# Patient Record
Sex: MALE | Race: ASIAN | Employment: FULL TIME | URBAN - METROPOLITAN AREA
[De-identification: names, ages, dates, MRNs, and addresses within clinical notes are randomized per-mention and may not be internally consistent; named-entity substitution may affect disease eponyms.]

---

## 2021-10-01 ENCOUNTER — OFFICE VISIT (OUTPATIENT)
Dept: INTERNAL MEDICINE CLINIC | Facility: CLINIC | Age: 46
End: 2021-10-01
Payer: COMMERCIAL

## 2021-10-01 VITALS
HEIGHT: 69 IN | TEMPERATURE: 97 F | HEART RATE: 68 BPM | DIASTOLIC BLOOD PRESSURE: 78 MMHG | RESPIRATION RATE: 18 BRPM | BODY MASS INDEX: 30.36 KG/M2 | SYSTOLIC BLOOD PRESSURE: 122 MMHG | OXYGEN SATURATION: 97 % | WEIGHT: 205 LBS

## 2021-10-01 DIAGNOSIS — Z13.0 SCREENING FOR BLOOD DISEASE: ICD-10-CM

## 2021-10-01 DIAGNOSIS — Z13.29 SCREENING FOR THYROID DISORDER: ICD-10-CM

## 2021-10-01 DIAGNOSIS — Z00.00 ROUTINE GENERAL MEDICAL EXAMINATION AT A HEALTH CARE FACILITY: Primary | ICD-10-CM

## 2021-10-01 DIAGNOSIS — H26.9 CATARACT OF RIGHT EYE, UNSPECIFIED CATARACT TYPE: ICD-10-CM

## 2021-10-01 DIAGNOSIS — Z13.220 SCREENING FOR LIPID DISORDERS: ICD-10-CM

## 2021-10-01 DIAGNOSIS — Z13.228 SCREENING FOR METABOLIC DISORDER: ICD-10-CM

## 2021-10-01 DIAGNOSIS — E78.5 DYSLIPIDEMIA: ICD-10-CM

## 2021-10-01 DIAGNOSIS — Z12.5 SCREENING FOR PROSTATE CANCER: ICD-10-CM

## 2021-10-01 PROCEDURE — 99386 PREV VISIT NEW AGE 40-64: CPT | Performed by: INTERNAL MEDICINE

## 2021-10-01 PROCEDURE — 3008F BODY MASS INDEX DOCD: CPT | Performed by: INTERNAL MEDICINE

## 2021-10-01 PROCEDURE — 3078F DIAST BP <80 MM HG: CPT | Performed by: INTERNAL MEDICINE

## 2021-10-01 PROCEDURE — 3074F SYST BP LT 130 MM HG: CPT | Performed by: INTERNAL MEDICINE

## 2021-10-01 RX ORDER — MULTIVITAMIN
1 TABLET ORAL DAILY
COMMUNITY

## 2021-10-01 RX ORDER — PRAVASTATIN SODIUM 20 MG
20 TABLET ORAL DAILY
COMMUNITY
Start: 2021-08-27

## 2021-10-01 NOTE — PROGRESS NOTES
Terrie Black  6/6/1975    Patient presents with:  Physical: RG rm 7 New pt Physical      HPI:   Terrie Black is a 40year old male who presents for an annual physical examination.     The patient describes a long-standing history of an intermitt • Dementia Father    • Glaucoma Father    • Other (Other) Father         prostate disease   • Diabetes Mother    • Lipids Mother    • Stroke Maternal Grandmother    • Other (Other) Maternal Grandmother         prostate issues   • Heart Disorder Paternal ORDERS     Orders Placed This Encounter      PSA Screen      CBC With Diff      CMP      Lipid      TSH W Reflex To Free T4      PSA - Total [5363][Q]      Meds & Refills:  Requested Prescriptions      No prescriptions requested or ordered in this encounte

## 2021-10-06 ENCOUNTER — MED REC SCAN ONLY (OUTPATIENT)
Dept: INTERNAL MEDICINE CLINIC | Facility: CLINIC | Age: 46
End: 2021-10-06

## 2021-10-15 ENCOUNTER — OFFICE VISIT (OUTPATIENT)
Dept: INTERNAL MEDICINE CLINIC | Facility: CLINIC | Age: 46
End: 2021-10-15
Payer: COMMERCIAL

## 2021-10-15 VITALS
RESPIRATION RATE: 16 BRPM | HEIGHT: 69.29 IN | TEMPERATURE: 98 F | HEART RATE: 79 BPM | OXYGEN SATURATION: 99 % | SYSTOLIC BLOOD PRESSURE: 122 MMHG | WEIGHT: 207.19 LBS | BODY MASS INDEX: 30.34 KG/M2 | DIASTOLIC BLOOD PRESSURE: 76 MMHG

## 2021-10-15 DIAGNOSIS — K21.9 GASTROESOPHAGEAL REFLUX DISEASE WITHOUT ESOPHAGITIS: ICD-10-CM

## 2021-10-15 DIAGNOSIS — E78.5 DYSLIPIDEMIA: Primary | ICD-10-CM

## 2021-10-15 PROCEDURE — 3074F SYST BP LT 130 MM HG: CPT | Performed by: INTERNAL MEDICINE

## 2021-10-15 PROCEDURE — 3078F DIAST BP <80 MM HG: CPT | Performed by: INTERNAL MEDICINE

## 2021-10-15 PROCEDURE — 99214 OFFICE O/P EST MOD 30 MIN: CPT | Performed by: INTERNAL MEDICINE

## 2021-10-15 PROCEDURE — 3008F BODY MASS INDEX DOCD: CPT | Performed by: INTERNAL MEDICINE

## 2021-10-15 RX ORDER — FAMOTIDINE 40 MG/1
40 TABLET, FILM COATED ORAL DAILY
Qty: 90 TABLET | Refills: 3 | Status: SHIPPED | OUTPATIENT
Start: 2021-10-15

## 2021-10-15 NOTE — PROGRESS NOTES
Renea Wheatley  6/6/1975    Patient presents with:  Test Results: ES rm - 7 Test results      Filippo Finch is a 40year old male who presents as a follow-up. The patient has remained in his usual state of health.   He denies any ac distal pulses. No murmur, rubs or gallops. Pulmonary/Chest: Effort normal and breath sounds normal. No respiratory distress. Abdominal: Soft. Bowel sounds are normal. Non tender, no masses, no organomegaly or hernias.   Musculoskeletal: No edema  Skin:

## 2022-01-06 ENCOUNTER — TELEMEDICINE (OUTPATIENT)
Dept: INTERNAL MEDICINE CLINIC | Facility: CLINIC | Age: 47
End: 2022-01-06

## 2022-01-06 DIAGNOSIS — M79.645 THUMB PAIN, LEFT: Primary | ICD-10-CM

## 2022-01-06 PROCEDURE — 99213 OFFICE O/P EST LOW 20 MIN: CPT | Performed by: INTERNAL MEDICINE

## 2022-01-06 RX ORDER — CLOTRIMAZOLE AND BETAMETHASONE DIPROPIONATE 10; .64 MG/G; MG/G
1 CREAM TOPICAL 2 TIMES DAILY PRN
Qty: 2 EACH | Refills: 3 | Status: SHIPPED | OUTPATIENT
Start: 2022-01-06

## 2022-01-06 NOTE — PROGRESS NOTES
Karen Akron  6/6/1975    No chief complaint on file. Video encounter    Abhay Goldstein is a 40year old male who presents with left thumb pain. The patient describes a 4-day duration of a pain involving the left thumb base.   Sym Ozzy Mason is a 40year old male who presents with left thumb pain.     Thumb tendinitis versus de Quervain's tenosynovitis  I have advised use of NSAID therapy and symptom observation  If symptoms improve, continue NSAID therapy with food until resolution

## 2022-01-21 ENCOUNTER — HOSPITAL ENCOUNTER (OUTPATIENT)
Dept: GENERAL RADIOLOGY | Age: 47
Discharge: HOME | End: 2022-01-21
Attending: INTERNAL MEDICINE
Payer: COMMERCIAL

## 2022-01-21 ENCOUNTER — OFFICE VISIT (OUTPATIENT)
Dept: INTERNAL MEDICINE CLINIC | Facility: CLINIC | Age: 47
End: 2022-01-21
Payer: COMMERCIAL

## 2022-01-21 ENCOUNTER — PATIENT MESSAGE (OUTPATIENT)
Dept: INTERNAL MEDICINE CLINIC | Facility: CLINIC | Age: 47
End: 2022-01-21

## 2022-01-21 VITALS
SYSTOLIC BLOOD PRESSURE: 112 MMHG | DIASTOLIC BLOOD PRESSURE: 82 MMHG | OXYGEN SATURATION: 99 % | HEIGHT: 69 IN | HEART RATE: 78 BPM | TEMPERATURE: 97 F | WEIGHT: 209 LBS | RESPIRATION RATE: 16 BRPM | BODY MASS INDEX: 30.96 KG/M2

## 2022-01-21 DIAGNOSIS — M79.671 CHRONIC PAIN OF RIGHT HEEL: ICD-10-CM

## 2022-01-21 DIAGNOSIS — G89.29 CHRONIC PAIN OF RIGHT HEEL: ICD-10-CM

## 2022-01-21 DIAGNOSIS — M79.645 PAIN OF LEFT THUMB: Primary | ICD-10-CM

## 2022-01-21 DIAGNOSIS — M79.645 PAIN OF LEFT THUMB: ICD-10-CM

## 2022-01-21 PROCEDURE — 3008F BODY MASS INDEX DOCD: CPT | Performed by: INTERNAL MEDICINE

## 2022-01-21 PROCEDURE — 73650 X-RAY EXAM OF HEEL: CPT | Performed by: INTERNAL MEDICINE

## 2022-01-21 PROCEDURE — 99213 OFFICE O/P EST LOW 20 MIN: CPT | Performed by: INTERNAL MEDICINE

## 2022-01-21 PROCEDURE — 3079F DIAST BP 80-89 MM HG: CPT | Performed by: INTERNAL MEDICINE

## 2022-01-21 PROCEDURE — 3074F SYST BP LT 130 MM HG: CPT | Performed by: INTERNAL MEDICINE

## 2022-01-21 PROCEDURE — 73130 X-RAY EXAM OF HAND: CPT | Performed by: INTERNAL MEDICINE

## 2022-01-21 NOTE — PROGRESS NOTES
Joaquín White  6/6/1975    Patient presents with: Follow - Up: On Lt thumb pain . Room # 6  Heel Pain: Rt       SUBJECTIVE   Joaquín White is a 40year old male who presents as a follow-up.     From prior encounter:   The patient describes a 4-da times daily as needed. 2 each 3   • triamcinolone 0.1 % External Cream Apply topically 2 (two) times daily as needed. 60 g 3   • famotidine (PEPCID) 40 MG Oral Tab Take 1 tablet (40 mg total) by mouth daily.  90 tablet 3   • pravastatin 20 MG Oral Tab Take follow-up.     Pain at left thumb base:  NSAID nonresponsive  X-ray ordered  Patient has been referred to the orthopedic surgery service for further evaluation and management    Chronic right heel pain:   Secondary to trauma several years ago  Begin evaluat

## 2022-01-24 ENCOUNTER — TELEPHONE (OUTPATIENT)
Dept: ORTHOPEDICS CLINIC | Facility: CLINIC | Age: 47
End: 2022-01-24

## 2022-01-24 NOTE — TELEPHONE ENCOUNTER
01/21/22 PROCEDURE:  XR HAND (MIN 3 VIEWS), LEFT   FINDINGS:  Joint spaces are normal.  No fracture, expansile lesion or erosion.  No periosteal reaction.  Soft tissue contours appear normal.   Impression   CONCLUSION:  No abnormality demonstrated.

## 2022-01-24 NOTE — TELEPHONE ENCOUNTER
From: Kanika Barba  To: Ivan Borden MD  Sent: 1/21/2022 5:14 PM CST  Subject: [facility] [facility] medical records     I am looking to get a copy of the medical records that is in my file from [facility] [address] [facility].  Please let me know whether you cou

## 2022-02-19 ENCOUNTER — PATIENT MESSAGE (OUTPATIENT)
Dept: INTERNAL MEDICINE CLINIC | Facility: CLINIC | Age: 47
End: 2022-02-19

## 2022-02-21 RX ORDER — TADALAFIL 20 MG/1
20 TABLET ORAL AS NEEDED
Qty: 24 TABLET | Refills: 1 | Status: SHIPPED | OUTPATIENT
Start: 2022-02-21 | End: 2022-02-23

## 2022-02-21 RX ORDER — PRAVASTATIN SODIUM 20 MG
20 TABLET ORAL DAILY
Qty: 90 TABLET | Refills: 0 | Status: SHIPPED | OUTPATIENT
Start: 2022-02-21

## 2022-02-21 NOTE — TELEPHONE ENCOUNTER
Last VISIT 01/21/22    Last CPE 10/01/21    Last REFILL Not on file    Last LABS 10/09/21 CBC, CMP, Lipid, TSH, PSA done    No future appointments. Per PROTOCOL? Not on protocol      Please Approve or Deny.

## 2022-02-23 RX ORDER — TADALAFIL 20 MG/1
20 TABLET ORAL AS NEEDED
Qty: 24 TABLET | Refills: 1 | Status: SHIPPED | OUTPATIENT
Start: 2022-02-23

## 2022-02-23 NOTE — TELEPHONE ENCOUNTER
Last visit- 01/21/2022    Last refill- 02/21/2022 tadalafil 20mg QTY24 1R    Last labs- 10/09/2021 psa, tsh, lipid, cmp, cbc    No future appointments. Per Protocol?   Please approve or deny

## 2022-04-27 RX ORDER — PRAVASTATIN SODIUM 20 MG
TABLET ORAL
Qty: 90 TABLET | Refills: 0 | Status: SHIPPED | OUTPATIENT
Start: 2022-04-27

## 2022-05-23 ENCOUNTER — OFFICE VISIT (OUTPATIENT)
Dept: INTERNAL MEDICINE CLINIC | Facility: CLINIC | Age: 47
End: 2022-05-23
Payer: COMMERCIAL

## 2022-05-23 VITALS
WEIGHT: 207 LBS | HEART RATE: 76 BPM | SYSTOLIC BLOOD PRESSURE: 124 MMHG | RESPIRATION RATE: 16 BRPM | TEMPERATURE: 98 F | HEIGHT: 69 IN | DIASTOLIC BLOOD PRESSURE: 82 MMHG | BODY MASS INDEX: 30.66 KG/M2 | OXYGEN SATURATION: 96 %

## 2022-05-23 DIAGNOSIS — M79.644 CHRONIC PAIN OF RIGHT THUMB: Primary | ICD-10-CM

## 2022-05-23 DIAGNOSIS — G89.29 CHRONIC PAIN OF RIGHT THUMB: Primary | ICD-10-CM

## 2022-05-23 DIAGNOSIS — L29.9 ITCH OF SKIN: ICD-10-CM

## 2022-05-23 PROCEDURE — 3074F SYST BP LT 130 MM HG: CPT | Performed by: INTERNAL MEDICINE

## 2022-05-23 PROCEDURE — 99214 OFFICE O/P EST MOD 30 MIN: CPT | Performed by: INTERNAL MEDICINE

## 2022-05-23 PROCEDURE — 3008F BODY MASS INDEX DOCD: CPT | Performed by: INTERNAL MEDICINE

## 2022-05-23 PROCEDURE — 3079F DIAST BP 80-89 MM HG: CPT | Performed by: INTERNAL MEDICINE

## 2022-06-27 ENCOUNTER — OFFICE VISIT (OUTPATIENT)
Dept: INTERNAL MEDICINE CLINIC | Facility: CLINIC | Age: 47
End: 2022-06-27
Payer: COMMERCIAL

## 2022-06-27 VITALS
WEIGHT: 203 LBS | SYSTOLIC BLOOD PRESSURE: 118 MMHG | TEMPERATURE: 97 F | HEIGHT: 69 IN | HEART RATE: 70 BPM | DIASTOLIC BLOOD PRESSURE: 74 MMHG | BODY MASS INDEX: 30.07 KG/M2

## 2022-06-27 DIAGNOSIS — D17.1 LIPOMA OF ABDOMINAL WALL: Primary | ICD-10-CM

## 2022-06-27 DIAGNOSIS — S39.012A STRAIN OF LUMBAR REGION, INITIAL ENCOUNTER: ICD-10-CM

## 2022-06-27 DIAGNOSIS — R39.15 URINARY URGENCY: ICD-10-CM

## 2022-06-27 DIAGNOSIS — M54.50 ACUTE LEFT-SIDED LOW BACK PAIN WITHOUT SCIATICA: ICD-10-CM

## 2022-06-27 LAB
BILIRUB UR QL STRIP.AUTO: NEGATIVE
BILIRUBIN: NEGATIVE
CLARITY UR REFRACT.AUTO: CLEAR
COLOR UR AUTO: YELLOW
GLUCOSE (URINE DIPSTICK): NEGATIVE MG/DL
GLUCOSE UR STRIP.AUTO-MCNC: NEGATIVE MG/DL
KETONES (URINE DIPSTICK): NEGATIVE MG/DL
KETONES UR STRIP.AUTO-MCNC: NEGATIVE MG/DL
LEUKOCYTE ESTERASE UR QL STRIP.AUTO: NEGATIVE
LEUKOCYTES: NEGATIVE
MULTISTIX LOT#: ABNORMAL NUMERIC
NITRITE UR QL STRIP.AUTO: NEGATIVE
NITRITE, URINE: NEGATIVE
PH UR STRIP.AUTO: 5 [PH] (ref 5–8)
PH, URINE: 5.5 (ref 4.5–8)
PROT UR STRIP.AUTO-MCNC: NEGATIVE MG/DL
PROTEIN (URINE DIPSTICK): NEGATIVE MG/DL
SP GR UR STRIP.AUTO: 1.02 (ref 1–1.03)
SPECIFIC GRAVITY: 1.02 (ref 1–1.03)
URINE-COLOR: YELLOW
UROBILINOGEN UR STRIP.AUTO-MCNC: <2 MG/DL
UROBILINOGEN,SEMI-QN: 0.2 MG/DL (ref 0–1.9)

## 2022-06-27 PROCEDURE — 81001 URINALYSIS AUTO W/SCOPE: CPT | Performed by: FAMILY MEDICINE

## 2022-06-27 RX ORDER — DOCUSATE SODIUM 100 MG/1
100 CAPSULE, LIQUID FILLED ORAL 2 TIMES DAILY PRN
COMMUNITY

## 2022-06-28 ENCOUNTER — PATIENT MESSAGE (OUTPATIENT)
Dept: INTERNAL MEDICINE CLINIC | Facility: CLINIC | Age: 47
End: 2022-06-28

## 2022-06-29 NOTE — TELEPHONE ENCOUNTER
From: Jose Sheffield  To: Reji Villar MD  Sent: 6/28/2022 6:06 PM CDT  Subject: Question regarding URINALYSIS WITH CULTURE REFLEX    I still do have a discomfort in my lower abdomen and slight pain and discomfort in my testicles.  Please advice on what I should do

## 2022-06-29 NOTE — TELEPHONE ENCOUNTER
Please advise on patient message. Last office visit: 6/27  UA Results 6/27: \"No significant microscopic hematuria, < 3 RBC/hpf. No bacteria seen.  No follow-up studies needed at this time\"

## 2022-06-30 ENCOUNTER — OFFICE VISIT (OUTPATIENT)
Dept: INTERNAL MEDICINE CLINIC | Facility: CLINIC | Age: 47
End: 2022-06-30
Payer: COMMERCIAL

## 2022-06-30 VITALS
TEMPERATURE: 98 F | OXYGEN SATURATION: 97 % | RESPIRATION RATE: 16 BRPM | SYSTOLIC BLOOD PRESSURE: 128 MMHG | WEIGHT: 204 LBS | HEART RATE: 74 BPM | DIASTOLIC BLOOD PRESSURE: 76 MMHG | HEIGHT: 69.09 IN | BODY MASS INDEX: 30.21 KG/M2

## 2022-06-30 DIAGNOSIS — S39.011A STRAIN OF ABDOMINAL MUSCLE, INITIAL ENCOUNTER: ICD-10-CM

## 2022-06-30 DIAGNOSIS — R10.9 ABDOMINAL WALL PAIN: Primary | ICD-10-CM

## 2022-06-30 PROCEDURE — 3074F SYST BP LT 130 MM HG: CPT | Performed by: FAMILY MEDICINE

## 2022-06-30 PROCEDURE — 3008F BODY MASS INDEX DOCD: CPT | Performed by: FAMILY MEDICINE

## 2022-06-30 PROCEDURE — 99213 OFFICE O/P EST LOW 20 MIN: CPT | Performed by: FAMILY MEDICINE

## 2022-06-30 PROCEDURE — 3078F DIAST BP <80 MM HG: CPT | Performed by: FAMILY MEDICINE

## 2022-07-27 RX ORDER — PRAVASTATIN SODIUM 20 MG
TABLET ORAL
Qty: 90 TABLET | Refills: 0 | Status: SHIPPED | OUTPATIENT
Start: 2022-07-27

## 2022-07-27 NOTE — TELEPHONE ENCOUNTER
Last visit- 10/01/2021 cpe seen by AD    Last refill- 04/27/2022 pravastatin 20mg QTY90 0R    Last labs- 10/09/2021 psa, tsh, lipid, cmp, cbc    No future appointments.     Per Protocol- Passed

## 2022-08-05 RX ORDER — PRAVASTATIN SODIUM 20 MG
TABLET ORAL
Qty: 90 TABLET | Refills: 0 | OUTPATIENT
Start: 2022-08-05

## 2022-08-23 ENCOUNTER — TELEPHONE (OUTPATIENT)
Dept: INTERNAL MEDICINE CLINIC | Facility: CLINIC | Age: 47
End: 2022-08-23

## 2022-08-23 NOTE — TELEPHONE ENCOUNTER
Patient given strong ER precautions with any return of pain in his testicle. Pt verbalizes understanding.

## 2022-08-23 NOTE — TELEPHONE ENCOUNTER
Patient states pt had severe pain at 1pm yesterday 8/22, started from left testicle to left lower abdomen and then around to his lower back, could not sit straight, nauseated, took Advil and famotidine then about 4:30-5pm yesterday the pain went away completely with no recurrence. Pt has an appt with [address] for 8/25/22. Pt notified to call if the pain recurs before his appt. Pt states his mother has a history of kidney stones. Pt verbalizes understanding. Any other recommendtions? LOV 6/30/22 with [address]

## 2022-08-23 NOTE — TELEPHONE ENCOUNTER
Started in left testicle, traveled to abdomen and then to back. Sharp stabbing pain, since yesterday. Patient is scheduled with [address] for 8/25/22.     Future Appointments   Date Time Provider Nahun Cisneros   8/25/2022  8:00 AM Ben Waterman MD [facility]

## 2022-08-25 ENCOUNTER — OFFICE VISIT (OUTPATIENT)
Dept: SURGERY | Facility: CLINIC | Age: 47
End: 2022-08-25
Payer: COMMERCIAL

## 2022-08-25 ENCOUNTER — OFFICE VISIT (OUTPATIENT)
Dept: INTERNAL MEDICINE CLINIC | Facility: CLINIC | Age: 47
End: 2022-08-25
Payer: COMMERCIAL

## 2022-08-25 ENCOUNTER — APPOINTMENT (OUTPATIENT)
Dept: LAB | Age: 47
End: 2022-08-25
Attending: FAMILY MEDICINE
Payer: COMMERCIAL

## 2022-08-25 ENCOUNTER — TELEPHONE (OUTPATIENT)
Dept: INTERNAL MEDICINE CLINIC | Facility: CLINIC | Age: 47
End: 2022-08-25

## 2022-08-25 ENCOUNTER — HOSPITAL ENCOUNTER (OUTPATIENT)
Dept: CT IMAGING | Age: 47
Discharge: HOME | End: 2022-08-25
Attending: FAMILY MEDICINE
Payer: COMMERCIAL

## 2022-08-25 ENCOUNTER — LAB ENCOUNTER (OUTPATIENT)
Dept: LAB | Facility: HOSPITAL | Age: 47
End: 2022-08-25
Attending: FAMILY MEDICINE
Payer: COMMERCIAL

## 2022-08-25 VITALS
HEART RATE: 64 BPM | SYSTOLIC BLOOD PRESSURE: 124 MMHG | DIASTOLIC BLOOD PRESSURE: 84 MMHG | TEMPERATURE: 96 F | BODY MASS INDEX: 29.77 KG/M2 | WEIGHT: 201 LBS | HEIGHT: 69 IN

## 2022-08-25 DIAGNOSIS — R31.29 MICROSCOPIC HEMATURIA: ICD-10-CM

## 2022-08-25 DIAGNOSIS — R10.9 ACUTE LEFT FLANK PAIN: ICD-10-CM

## 2022-08-25 DIAGNOSIS — R31.0 HEMATURIA, GROSS: ICD-10-CM

## 2022-08-25 DIAGNOSIS — N20.0 NEPHROLITHIASIS: Primary | ICD-10-CM

## 2022-08-25 DIAGNOSIS — R10.9 ACUTE LEFT FLANK PAIN: Primary | ICD-10-CM

## 2022-08-25 LAB
ANION GAP SERPL CALC-SCNC: 2 MMOL/L (ref 0–18)
APPEARANCE: CLEAR
BASOPHILS # BLD AUTO: 0.09 X10(3) UL (ref 0–0.2)
BASOPHILS NFR BLD AUTO: 1 %
BILIRUBIN: NEGATIVE
BUN BLD-MCNC: 16 MG/DL (ref 7–18)
CALCIUM BLD-MCNC: 9.5 MG/DL (ref 8.5–10.1)
CHLORIDE SERPL-SCNC: 103 MMOL/L (ref 98–112)
CO2 SERPL-SCNC: 30 MMOL/L (ref 21–32)
CREAT BLD-MCNC: 1.11 MG/DL
EOSINOPHIL # BLD AUTO: 0.34 X10(3) UL (ref 0–0.7)
EOSINOPHIL NFR BLD AUTO: 3.8 %
ERYTHROCYTE [DISTWIDTH] IN BLOOD BY AUTOMATED COUNT: 13 %
FASTING STATUS PATIENT QL REPORTED: NO
GFR SERPLBLD BASED ON 1.73 SQ M-ARVRAT: 83 ML/MIN/1.73M2 (ref 60–?)
GLUCOSE (URINE DIPSTICK): NEGATIVE MG/DL
GLUCOSE BLD-MCNC: 96 MG/DL (ref 70–99)
HCT VFR BLD AUTO: 47.8 %
HGB BLD-MCNC: 16.2 G/DL
IMM GRANULOCYTES # BLD AUTO: 0.02 X10(3) UL (ref 0–1)
IMM GRANULOCYTES NFR BLD: 0.2 %
KETONES (URINE DIPSTICK): NEGATIVE MG/DL
LEUKOCYTES: NEGATIVE
LYMPHOCYTES # BLD AUTO: 2.3 X10(3) UL (ref 1–4)
LYMPHOCYTES NFR BLD AUTO: 25.9 %
MCH RBC QN AUTO: 29.1 PG (ref 26–34)
MCHC RBC AUTO-ENTMCNC: 33.9 G/DL (ref 31–37)
MCV RBC AUTO: 86 FL
MONOCYTES # BLD AUTO: 0.86 X10(3) UL (ref 0.1–1)
MONOCYTES NFR BLD AUTO: 9.7 %
MULTISTIX LOT#: ABNORMAL NUMERIC
NEUTROPHILS # BLD AUTO: 5.26 X10 (3) UL (ref 1.5–7.7)
NEUTROPHILS # BLD AUTO: 5.26 X10(3) UL (ref 1.5–7.7)
NEUTROPHILS NFR BLD AUTO: 59.4 %
NITRITE, URINE: NEGATIVE
OSMOLALITY SERPL CALC.SUM OF ELEC: 281 MOSM/KG (ref 275–295)
PH, URINE: 5.5 (ref 4.5–8)
PLATELET # BLD AUTO: 282 10(3)UL (ref 150–450)
POTASSIUM SERPL-SCNC: 4 MMOL/L (ref 3.5–5.1)
PROTEIN (URINE DIPSTICK): NEGATIVE MG/DL
RBC # BLD AUTO: 5.56 X10(6)UL
SODIUM SERPL-SCNC: 135 MMOL/L (ref 136–145)
SPECIFIC GRAVITY: 1.01 (ref 1–1.03)
URINE-COLOR: YELLOW
UROBILINOGEN,SEMI-QN: 0.2 MG/DL (ref 0–1.9)
WBC # BLD AUTO: 8.9 X10(3) UL (ref 4–11)

## 2022-08-25 PROCEDURE — 3074F SYST BP LT 130 MM HG: CPT | Performed by: FAMILY MEDICINE

## 2022-08-25 PROCEDURE — 87086 URINE CULTURE/COLONY COUNT: CPT | Performed by: FAMILY MEDICINE

## 2022-08-25 PROCEDURE — 85025 COMPLETE CBC W/AUTO DIFF WBC: CPT | Performed by: SURGERY

## 2022-08-25 PROCEDURE — 74176 CT ABD & PELVIS W/O CONTRAST: CPT | Performed by: FAMILY MEDICINE

## 2022-08-25 PROCEDURE — 3008F BODY MASS INDEX DOCD: CPT | Performed by: FAMILY MEDICINE

## 2022-08-25 PROCEDURE — 3079F DIAST BP 80-89 MM HG: CPT | Performed by: FAMILY MEDICINE

## 2022-08-25 PROCEDURE — 36415 COLL VENOUS BLD VENIPUNCTURE: CPT | Performed by: SURGERY

## 2022-08-25 PROCEDURE — 80048 BASIC METABOLIC PNL TOTAL CA: CPT | Performed by: SURGERY

## 2022-08-25 PROCEDURE — 81003 URINALYSIS AUTO W/O SCOPE: CPT | Performed by: FAMILY MEDICINE

## 2022-08-25 PROCEDURE — 99214 OFFICE O/P EST MOD 30 MIN: CPT | Performed by: FAMILY MEDICINE

## 2022-08-25 RX ORDER — TAMSULOSIN HYDROCHLORIDE 0.4 MG/1
0.4 CAPSULE ORAL EVERY EVENING
Qty: 30 CAPSULE | Refills: 0 | Status: SHIPPED | OUTPATIENT
Start: 2022-08-25

## 2022-08-25 NOTE — TELEPHONE ENCOUNTER
Pt had  CT today and results sent to his mychart-wants to know next steps?  [address] has not looked at results yet-please call after reviewed

## 2022-09-02 ENCOUNTER — TELEPHONE (OUTPATIENT)
Dept: SURGERY | Facility: CLINIC | Age: 47
End: 2022-09-02

## 2022-09-02 DIAGNOSIS — N20.0 KIDNEY STONE: Primary | ICD-10-CM

## 2022-09-02 RX ORDER — CLINDAMYCIN PHOSPHATE 900 MG/50ML
900 INJECTION INTRAVENOUS ONCE
Status: CANCELLED | OUTPATIENT
Start: 2022-09-02 | End: 2022-09-02

## 2022-09-02 NOTE — TELEPHONE ENCOUNTER
Patient called back at 09/02 and is proceeding with surgery on 09/06. Aware office was close and stated he was just going to show since office did not cancel that he was proceeding. Was unable to confirm with pre admissions.  Just FYI

## 2022-09-04 ENCOUNTER — LAB ENCOUNTER (OUTPATIENT)
Dept: LAB | Facility: HOSPITAL | Age: 47
End: 2022-09-04
Attending: SURGERY
Payer: COMMERCIAL

## 2022-09-04 DIAGNOSIS — Z01.818 PRE-OP TESTING: ICD-10-CM

## 2022-09-05 LAB — SARS-COV-2 RNA RESP QL NAA+PROBE: NOT DETECTED

## 2022-09-06 ENCOUNTER — HOSPITAL ENCOUNTER (OUTPATIENT)
Facility: HOSPITAL | Age: 47
Setting detail: HOSPITAL OUTPATIENT SURGERY
Discharge: HOME | End: 2022-09-06
Attending: SURGERY | Admitting: SURGERY
Payer: COMMERCIAL

## 2022-09-06 ENCOUNTER — ANESTHESIA EVENT (OUTPATIENT)
Dept: SURGERY | Facility: HOSPITAL | Age: 47
End: 2022-09-06
Payer: COMMERCIAL

## 2022-09-06 ENCOUNTER — ANESTHESIA (OUTPATIENT)
Dept: SURGERY | Facility: HOSPITAL | Age: 47
End: 2022-09-06
Payer: COMMERCIAL

## 2022-09-06 ENCOUNTER — APPOINTMENT (OUTPATIENT)
Dept: GENERAL RADIOLOGY | Facility: HOSPITAL | Age: 47
End: 2022-09-06
Attending: SURGERY
Payer: COMMERCIAL

## 2022-09-06 VITALS
RESPIRATION RATE: 16 BRPM | DIASTOLIC BLOOD PRESSURE: 91 MMHG | BODY MASS INDEX: 29.33 KG/M2 | HEIGHT: 69 IN | OXYGEN SATURATION: 100 % | WEIGHT: 198 LBS | TEMPERATURE: 98 F | SYSTOLIC BLOOD PRESSURE: 152 MMHG | HEART RATE: 81 BPM

## 2022-09-06 DIAGNOSIS — N20.0 NEPHROLITHIASIS: ICD-10-CM

## 2022-09-06 DIAGNOSIS — Z01.818 PRE-OP TESTING: Primary | ICD-10-CM

## 2022-09-06 PROCEDURE — 0TC78ZZ EXTIRPATION OF MATTER FROM LEFT URETER, VIA NATURAL OR ARTIFICIAL OPENING ENDOSCOPIC: ICD-10-PCS | Performed by: SURGERY

## 2022-09-06 PROCEDURE — BT1F0ZZ FLUOROSCOPY OF LEFT KIDNEY, URETER AND BLADDER USING HIGH OSMOLAR CONTRAST: ICD-10-PCS | Performed by: SURGERY

## 2022-09-06 PROCEDURE — 0T778DZ DILATION OF LEFT URETER WITH INTRALUMINAL DEVICE, VIA NATURAL OR ARTIFICIAL OPENING ENDOSCOPIC: ICD-10-PCS | Performed by: SURGERY

## 2022-09-06 PROCEDURE — 52356 CYSTO/URETERO W/LITHOTRIPSY: CPT | Performed by: SURGERY

## 2022-09-06 DEVICE — URETERAL STENT
Type: IMPLANTABLE DEVICE | Site: URETER | Status: FUNCTIONAL
Brand: ASCERTA™

## 2022-09-06 RX ORDER — CEFAZOLIN SODIUM/WATER 2 G/20 ML
SYRINGE (ML) INTRAVENOUS
Status: DISCONTINUED
Start: 2022-09-06 | End: 2022-09-06

## 2022-09-06 RX ORDER — MIDAZOLAM HYDROCHLORIDE 1 MG/ML
INJECTION INTRAMUSCULAR; INTRAVENOUS AS NEEDED
Status: DISCONTINUED | OUTPATIENT
Start: 2022-09-06 | End: 2022-09-06 | Stop reason: SURG

## 2022-09-06 RX ORDER — IBUPROFEN 600 MG/1
600 TABLET ORAL ONCE AS NEEDED
Status: DISCONTINUED | OUTPATIENT
Start: 2022-09-06 | End: 2022-09-06

## 2022-09-06 RX ORDER — SULFAMETHOXAZOLE AND TRIMETHOPRIM 800; 160 MG/1; MG/1
1 TABLET ORAL 2 TIMES DAILY
Qty: 4 TABLET | Refills: 0 | Status: SHIPPED | OUTPATIENT
Start: 2022-09-06 | End: 2022-09-08

## 2022-09-06 RX ORDER — HYDROMORPHONE HYDROCHLORIDE 1 MG/ML
0.2 INJECTION, SOLUTION INTRAMUSCULAR; INTRAVENOUS; SUBCUTANEOUS EVERY 5 MIN PRN
Status: DISCONTINUED | OUTPATIENT
Start: 2022-09-06 | End: 2022-09-06

## 2022-09-06 RX ORDER — SODIUM CHLORIDE, SODIUM LACTATE, POTASSIUM CHLORIDE, CALCIUM CHLORIDE 600; 310; 30; 20 MG/100ML; MG/100ML; MG/100ML; MG/100ML
INJECTION, SOLUTION INTRAVENOUS CONTINUOUS
Status: DISCONTINUED | OUTPATIENT
Start: 2022-09-06 | End: 2022-09-06

## 2022-09-06 RX ORDER — KETOROLAC TROMETHAMINE 30 MG/ML
INJECTION, SOLUTION INTRAMUSCULAR; INTRAVENOUS AS NEEDED
Status: DISCONTINUED | OUTPATIENT
Start: 2022-09-06 | End: 2022-09-06 | Stop reason: SURG

## 2022-09-06 RX ORDER — HYDROMORPHONE HYDROCHLORIDE 1 MG/ML
0.6 INJECTION, SOLUTION INTRAMUSCULAR; INTRAVENOUS; SUBCUTANEOUS EVERY 5 MIN PRN
Status: DISCONTINUED | OUTPATIENT
Start: 2022-09-06 | End: 2022-09-06

## 2022-09-06 RX ORDER — HYDROCODONE BITARTRATE AND ACETAMINOPHEN 5; 325 MG/1; MG/1
2 TABLET ORAL ONCE AS NEEDED
Status: DISCONTINUED | OUTPATIENT
Start: 2022-09-06 | End: 2022-09-06

## 2022-09-06 RX ORDER — PROCHLORPERAZINE EDISYLATE 5 MG/ML
5 INJECTION INTRAMUSCULAR; INTRAVENOUS EVERY 8 HOURS PRN
Status: DISCONTINUED | OUTPATIENT
Start: 2022-09-06 | End: 2022-09-06

## 2022-09-06 RX ORDER — CEFAZOLIN SODIUM/WATER 2 G/20 ML
2 SYRINGE (ML) INTRAVENOUS ONCE
Status: COMPLETED | OUTPATIENT
Start: 2022-09-06 | End: 2022-09-06

## 2022-09-06 RX ORDER — ONDANSETRON 2 MG/ML
INJECTION INTRAMUSCULAR; INTRAVENOUS AS NEEDED
Status: DISCONTINUED | OUTPATIENT
Start: 2022-09-06 | End: 2022-09-06 | Stop reason: SURG

## 2022-09-06 RX ORDER — ACETAMINOPHEN 500 MG
1000 TABLET ORAL ONCE
Status: DISCONTINUED | OUTPATIENT
Start: 2022-09-06 | End: 2022-09-06

## 2022-09-06 RX ORDER — LIDOCAINE HYDROCHLORIDE 20 MG/ML
JELLY TOPICAL AS NEEDED
Status: DISCONTINUED | OUTPATIENT
Start: 2022-09-06 | End: 2022-09-06

## 2022-09-06 RX ORDER — ONDANSETRON 2 MG/ML
4 INJECTION INTRAMUSCULAR; INTRAVENOUS EVERY 6 HOURS PRN
Status: DISCONTINUED | OUTPATIENT
Start: 2022-09-06 | End: 2022-09-06

## 2022-09-06 RX ORDER — SCOLOPAMINE TRANSDERMAL SYSTEM 1 MG/1
1 PATCH, EXTENDED RELEASE TRANSDERMAL ONCE
Status: DISCONTINUED | OUTPATIENT
Start: 2022-09-06 | End: 2022-09-06

## 2022-09-06 RX ORDER — DEXAMETHASONE SODIUM PHOSPHATE 4 MG/ML
VIAL (ML) INJECTION AS NEEDED
Status: DISCONTINUED | OUTPATIENT
Start: 2022-09-06 | End: 2022-09-06 | Stop reason: SURG

## 2022-09-06 RX ORDER — HYDROCODONE BITARTRATE AND ACETAMINOPHEN 5; 325 MG/1; MG/1
1 TABLET ORAL ONCE AS NEEDED
Status: DISCONTINUED | OUTPATIENT
Start: 2022-09-06 | End: 2022-09-06

## 2022-09-06 RX ORDER — NALOXONE HYDROCHLORIDE 0.4 MG/ML
80 INJECTION, SOLUTION INTRAMUSCULAR; INTRAVENOUS; SUBCUTANEOUS AS NEEDED
Status: DISCONTINUED | OUTPATIENT
Start: 2022-09-06 | End: 2022-09-06

## 2022-09-06 RX ORDER — ACETAMINOPHEN 500 MG
1000 TABLET ORAL ONCE AS NEEDED
Status: DISCONTINUED | OUTPATIENT
Start: 2022-09-06 | End: 2022-09-06

## 2022-09-06 RX ORDER — TAMSULOSIN HYDROCHLORIDE 0.4 MG/1
0.4 CAPSULE ORAL EVERY EVENING
Qty: 10 CAPSULE | Refills: 0 | Status: SHIPPED | OUTPATIENT
Start: 2022-09-06

## 2022-09-06 RX ORDER — HYDROMORPHONE HYDROCHLORIDE 1 MG/ML
0.4 INJECTION, SOLUTION INTRAMUSCULAR; INTRAVENOUS; SUBCUTANEOUS EVERY 5 MIN PRN
Status: DISCONTINUED | OUTPATIENT
Start: 2022-09-06 | End: 2022-09-06

## 2022-09-06 RX ORDER — OXYCODONE HYDROCHLORIDE 5 MG/1
5 TABLET ORAL EVERY 4 HOURS PRN
Qty: 5 TABLET | Refills: 0 | Status: SHIPPED | OUTPATIENT
Start: 2022-09-06

## 2022-09-06 RX ORDER — LIDOCAINE HYDROCHLORIDE 10 MG/ML
INJECTION, SOLUTION EPIDURAL; INFILTRATION; INTRACAUDAL; PERINEURAL AS NEEDED
Status: DISCONTINUED | OUTPATIENT
Start: 2022-09-06 | End: 2022-09-06 | Stop reason: SURG

## 2022-09-06 RX ORDER — PHENAZOPYRIDINE HYDROCHLORIDE 100 MG/1
200 TABLET, FILM COATED ORAL 3 TIMES DAILY PRN
Qty: 10 TABLET | Refills: 0 | Status: SHIPPED | OUTPATIENT
Start: 2022-09-06

## 2022-09-06 RX ADMIN — MIDAZOLAM HYDROCHLORIDE 2 MG: 1 INJECTION INTRAMUSCULAR; INTRAVENOUS at 07:39:00

## 2022-09-06 RX ADMIN — SODIUM CHLORIDE, SODIUM LACTATE, POTASSIUM CHLORIDE, CALCIUM CHLORIDE: 600; 310; 30; 20 INJECTION, SOLUTION INTRAVENOUS at 07:47:00

## 2022-09-06 RX ADMIN — ONDANSETRON 4 MG: 2 INJECTION INTRAMUSCULAR; INTRAVENOUS at 07:47:00

## 2022-09-06 RX ADMIN — SODIUM CHLORIDE, SODIUM LACTATE, POTASSIUM CHLORIDE, CALCIUM CHLORIDE: 600; 310; 30; 20 INJECTION, SOLUTION INTRAVENOUS at 09:28:00

## 2022-09-06 RX ADMIN — CEFAZOLIN SODIUM/WATER 2 G: 2 G/20 ML SYRINGE (ML) INTRAVENOUS at 07:46:00

## 2022-09-06 RX ADMIN — DEXAMETHASONE SODIUM PHOSPHATE 4 MG: 4 MG/ML VIAL (ML) INJECTION at 07:47:00

## 2022-09-06 RX ADMIN — LIDOCAINE HYDROCHLORIDE 25 MG: 10 INJECTION, SOLUTION EPIDURAL; INFILTRATION; INTRACAUDAL; PERINEURAL at 07:42:00

## 2022-09-06 RX ADMIN — KETOROLAC TROMETHAMINE 30 MG: 30 INJECTION, SOLUTION INTRAMUSCULAR; INTRAVENOUS at 08:32:00

## 2022-09-06 NOTE — OPERATIVE REPORT
Urology Operative Note    Attending Surgeon: Murali Spence MD    Assistant Surgeon: None    Patient Name: Lito Nice    Date of Surgery: 9/6/2022    Preoperative Diagnosis: Left obstructing ureteral stone     Postoperative Diagnosis: Same    Procedure Performed: Cystoscopy, left ureteroscopy, laser lithotripsy, basket stone extraction, retrograde pyelogram, ureteral stent placement    Indication:  Patient is a 56year old male who presented with a left obstructing ureteral stone who failed medical expulsive therapy. The patient was counseled on options, risks, and benefits and elected to undergo the above procedure. We discussed risks including, but not limited to, bleeding, infection, damage to surrounding structures, need for repeat procedure(s). The patient understood these risks and wished to proceed with surgery. Findings:  Cystoscopy - normal urethra without strictures, normal bladder. Impacted distal left ureteral stone with tight ureteral orifice. Procedure: The patient was taken to the operating room and a timeout was performed confirming the correct patient and procedure. The patient was prepped and draped in lithotomy position after undergoing general anesthesia. Pre-operative prophylactic antibiotics were given in the form of Cefazolin. The cystoscope was inserted per urethra and the bladder was inspected and drained. The left ureter was cannulated with a Sensor wire, and the wire was passed into the renal pelvis which I confirmed using fluoroscopy. Then, the cystoscope was remove and I inserted a semirigid ureteroscope alongside the wire, but it would not pass into the ureteral orifice. I attempted to pass it over a second wire and railroading between two wires and it would not pass. I then was able to pass a flexible ureteroscope over the second wire into the distal ureter. I removed the second wire. An impacted stone was seen in the distal ureter.  The stone was fragmented using a laser, and then the fragments were extracted using a zero tip basket. I was eventually able to switch back to the semirigid ureteroscope and pass it into the distal ureter to continue fragmenting and basketing the stone until it was complete. The ureteral orifice remained tight though making each pass of the scope somewhat tight. The stones were dropped in the bladder and later removed through the cystoscope. A 6-Azeri x 26 cm JJ ureteral stent was inserted over the remaining wire. The proximal coil was formed in the kidney under fluoroscopic guidance. The distal coil was formed within the bladder under direct cystoscopic visualization and fluoroscopy. The stent string was removed prior to stent placement. The bladder was drained and the cystoscope was removed. The patient was awoken from anesthesia and transferred to PACU in stable condition. The patient tolerated the procedure well. All instrument/supply counts were correct at the end of the case. Specimens:   Stone fragments for chemical analysis    Estimated Blood Loss:  2 mL    Tubes/Drains:  6-Azeri x 26 cm JJ left ureteral stent    Complications:   None immediate    Condition from OR:  Stable    Plan:   The patient will follow up in the office for stent removal in 1 week. Juarez Damico.  Antonio Carnes MD  Staff Urologist  [facility]  Office: [phone]

## 2022-09-06 NOTE — ANESTHESIA PROCEDURE NOTES
Airway  Date/Time: 9/6/2022 7:45 AM  Urgency: elective      General Information and Staff    Patient location during procedure: OR  Anesthesiologist: Randa Rae MD  Performed: anesthesiologist     Indications and Patient Condition  Indications for airway management: anesthesia  Sedation level: deep  Preoxygenated: yes  Patient position: sniffing  Mask difficulty assessment: 1 - vent by mask    Final Airway Details  Final airway type: supraglottic airway      Successful airway: Size 4      Number of attempts at approach: 1 Patient states she thinks she has a bladder infection/UTI.  Urinary frequency, pain, burning.  Please call patient to discuss.    If prescription needed:  [facility] [place] [facility] [place], [place] - [address]

## 2022-09-06 NOTE — ANESTHESIA POSTPROCEDURE EVALUATION
[facility] Patient Status:  Hospital Outpatient Surgery   Age/Gender 56year old male MRN [id number]   [facility] SURGERY Attending Sage Tong MD   Hosp Day # 0 PCP Guillermo Carrera MD       Anesthesia Post-op Note    CYSTOSCOPY, LEFT URETEROSCOPY, LEFT RETROGRADE PYELOGRAM, LASER LITHOTRIPSY, LEFT URETERAL STENT INSERTION    Procedure Summary     Date: 09/06/22 Room / Location: [address] [place] [address] OR 07 / [address] [facility] OR    Anesthesia Start: 7523 Anesthesia Stop:     Procedure: CYSTOSCOPY, LEFT URETEROSCOPY, LEFT RETROGRADE PYELOGRAM, LASER LITHOTRIPSY, LEFT URETERAL STENT INSERTION (Left Urethra) Diagnosis:       Nephrolithiasis      (KIDNEY STONE , PRIMARY)    Surgeons: Sage Tong MD Anesthesiologist: Allyson Payne MD    Anesthesia Type: general ASA Status: 2          Anesthesia Type: general    Vitals Value Taken Time   BP *103/65 09/06/22 0928   Temp 97 09/06/22 0928   Pulse 79 09/06/22 0928   Resp 16 09/06/22 0928   SpO2 94 09/06/22 0928       Patient Location: PACU    Anesthesia Type: general    Airway Patency: patent    Postop Pain Control: adequate    Mental Status: mildly sedated but able to meaningfully participate in the post-anesthesia evaluation    Nausea/Vomiting: none    Cardiopulmonary/Hydration status: stable euvolemic    Complications: no apparent anesthesia related complications    Postop vital signs: stable    Dental Exam: Unchanged from Preop    Patient to be discharged from PACU when criteria met.

## 2022-09-06 NOTE — INTERVAL H&P NOTE
Pre-op Diagnosis: KIDNEY STONE , PRIMARY    The above referenced H&P was reviewed by Thuy Youssef MD on 9/6/2022, the patient was examined and no significant changes have occurred in the patient's condition since the H&P was performed. I discussed with the patient and/or legal representative the potential benefits, risks and side effects of this procedure; the likelihood of the patient achieving goals; and potential problems that might occur during recuperation. I discussed reasonable alternatives to the procedure, including risks, benefits and side effects related to the alternatives and risks related to not receiving this procedure. We will proceed with procedure as planned. Still having pain - failed MET. OR for ureteroscopy, lithotripsy, stent on the left.

## 2022-09-08 ENCOUNTER — TELEPHONE (OUTPATIENT)
Dept: SURGERY | Facility: CLINIC | Age: 47
End: 2022-09-08

## 2022-09-08 ENCOUNTER — PATIENT MESSAGE (OUTPATIENT)
Dept: SURGERY | Facility: CLINIC | Age: 47
End: 2022-09-08

## 2022-09-08 NOTE — TELEPHONE ENCOUNTER
Pt called stating pt had surgery on 9-6-22. Need to have stent removed.   Please call pt to schedule

## 2022-09-09 ENCOUNTER — PATIENT MESSAGE (OUTPATIENT)
Dept: SURGERY | Facility: CLINIC | Age: 47
End: 2022-09-09

## 2022-09-09 LAB — CALCULI MASS: 23 MG

## 2022-09-09 NOTE — TELEPHONE ENCOUNTER
RN replied to patient and offered 9/14 Wed 9A cysto with stent removal with Dr Kori Gomez. S/P lithotripy on 9/6. Plan:   The patient will follow up in the office for stent removal in 1 week.

## 2022-09-09 NOTE — TELEPHONE ENCOUNTER
RN already reached out to patient via [employer] to offer 9/14 Wed 9A for cysto with stent removal.     Last read by Chema Alvarado at 8:53 AM on 9/9/2022. RN called patient to obtain confirmation. No answer. Left message.

## 2022-09-12 NOTE — TELEPHONE ENCOUNTER
I called the pt. He will be out of town this week until late Friday so he is unable to accept the cysto with stent removal appt on 9/14/2022. I discussed that MB is out of the office for several weeks, but I will check with MLD to see if he can be added to her scheduled. Pt stated he will be available next week 9/19 after 2:30 and 9/21 1p-4p. Pt discussed that he has pain at the tip of his penis with urination. I discussed that this can be expected after his surgery. I discussed rec to increase fluids. Pt verbalized understanding and all questions were answered. MLD please advise about appt, thanks.

## 2022-09-21 ENCOUNTER — PROCEDURE (OUTPATIENT)
Dept: SURGERY | Facility: CLINIC | Age: 47
End: 2022-09-21

## 2022-09-21 DIAGNOSIS — R82.90 URINE FINDING: Primary | ICD-10-CM

## 2022-09-21 DIAGNOSIS — N20.0 NEPHROLITHIASIS: ICD-10-CM

## 2022-09-21 LAB
APPEARANCE: CLEAR
BILIRUBIN: NEGATIVE
GLUCOSE (URINE DIPSTICK): NEGATIVE MG/DL
KETONES (URINE DIPSTICK): NEGATIVE MG/DL
MULTISTIX LOT#: ABNORMAL NUMERIC
NITRITE, URINE: NEGATIVE
PH, URINE: 5.5 (ref 4.5–8)
PROTEIN (URINE DIPSTICK): NEGATIVE MG/DL
SPECIFIC GRAVITY: 1.01 (ref 1–1.03)
URINE-COLOR: YELLOW
UROBILINOGEN,SEMI-QN: 0.2 MG/DL (ref 0–1.9)

## 2022-09-21 PROCEDURE — 81003 URINALYSIS AUTO W/O SCOPE: CPT | Performed by: PHYSICIAN ASSISTANT

## 2022-09-21 PROCEDURE — 52310 CYSTOSCOPY AND TREATMENT: CPT | Performed by: PHYSICIAN ASSISTANT

## 2022-09-21 RX ORDER — TAMSULOSIN HYDROCHLORIDE 0.4 MG/1
CAPSULE ORAL
Qty: 30 CAPSULE | Refills: 0 | OUTPATIENT
Start: 2022-09-21

## 2022-09-21 RX ORDER — CIPROFLOXACIN 500 MG/1
500 TABLET, FILM COATED ORAL ONCE
Status: COMPLETED | OUTPATIENT
Start: 2022-09-21 | End: 2022-09-21

## 2022-09-21 RX ADMIN — CIPROFLOXACIN 500 MG: 500 TABLET, FILM COATED ORAL at 13:00:00

## 2022-09-21 NOTE — PATIENT INSTRUCTIONS
You had your stent removed today. Symptoms of urinary urgency, frequency, and blood in the urine should improve with time. Stay well hydrated. Avoid bladder irritants like coffee, soda, tea if ongoing symptoms. Some patients may experience ureteral spasms post removal of the stent. Use ibuprofen and heat application if this occurs. If you develop any fevers or your pain is not relieve with oral pain medications please seek attention. General Recommendations to Avoid Future Kidney Stones     1. Drink enough water to produce 2 liters of clear urine daily. You may need to use a container at first to measure how much you are actually producing and increase your intake accordingly. Try to start the day off with a large glass of water as we all wake up dehydrated in the morning. 2. Add lemon or lime juice to your water. These juices contain citrate which naturally inhibits stone formation. An easy way to do this is using sugar free lemonade mix (ie Crystal Light or True Lemon (if you prefer to avoid aspartame))     3. Avoid salty foods such as prepackaged and fast foods, and do not add salt to foods. 4. Limit intake of the following group of foods to one serving daily: spinach, nuts (especially almonds), tea (especially black tea), chocolate, and potatoes. 5. Limit intake of Vitamin C supplements to less than 1000 mg daily. 6. Limit intake of animal protein (beef, chicken, turkey, fish, pork) to one serving daily. A good rule for portion size is no more meat than will fit in the palm of your hand. 7. Maintain 1-2 servings of dairy products daily (1 serving = 1 glass of milk, 2 slices of cheese, 1 scoop of ice cream, or 1 cup of yogurt). Try to decrease cheese intake as it may increase kidney stone risk compared to other dairy products. Do not eliminate calcium from your diet as it is necessary for bone health.      8. Eat a low fat diet and exercise at least 30 minutes 3 times per week to try and maintain your ideal body weight. Being overweight is a risk factor for kidney stones too!

## 2022-09-22 ENCOUNTER — TELEPHONE (OUTPATIENT)
Dept: INTERNAL MEDICINE CLINIC | Facility: CLINIC | Age: 47
End: 2022-09-22

## 2022-09-22 DIAGNOSIS — Z00.00 ROUTINE GENERAL MEDICAL EXAMINATION AT A HEALTH CARE FACILITY: ICD-10-CM

## 2022-09-22 DIAGNOSIS — Z13.228 SCREENING FOR METABOLIC DISORDER: ICD-10-CM

## 2022-09-22 DIAGNOSIS — Z13.220 SCREENING FOR LIPID DISORDERS: ICD-10-CM

## 2022-09-22 DIAGNOSIS — Z13.0 SCREENING FOR DISORDER OF BLOOD AND BLOOD-FORMING ORGANS: ICD-10-CM

## 2022-09-22 DIAGNOSIS — Z13.1 SCREENING FOR DIABETES MELLITUS: ICD-10-CM

## 2022-09-22 NOTE — TELEPHONE ENCOUNTER
Pt asking for a1c and UA both pending     Labs placed for [facility], per protocol.   Pending to [address] for approval

## 2022-09-22 NOTE — TELEPHONE ENCOUNTER
cpe   Future Appointments   Date Time Provider Nahun Cisneros   10/14/2022  3:20 PM Montserrat Reveles MD [facility]      Orders to [facility]  [facility] must fast no call back required

## 2022-09-29 ENCOUNTER — TELEPHONE (OUTPATIENT)
Dept: SURGERY | Facility: CLINIC | Age: 47
End: 2022-09-29

## 2022-09-29 NOTE — TELEPHONE ENCOUNTER
We received a refill request for Tamsulosin. #90. Does patient still need to be taking this medication?

## 2022-09-30 ENCOUNTER — TELEPHONE (OUTPATIENT)
Dept: SURGERY | Facility: CLINIC | Age: 47
End: 2022-09-30

## 2022-09-30 NOTE — TELEPHONE ENCOUNTER
----- Message from [address] [place] sent at 9/30/2022 11:38 AM CDT -----  Regarding: Tamsulosin  Sounds good and I still have some sporadic lower left abdomen pain sometime that travels to the lower back. Any advice?

## 2022-10-08 LAB
ABSOLUTE BASOPHILS: 88 CELLS/UL (ref 0–200)
ABSOLUTE EOSINOPHILS: 402 CELLS/UL (ref 15–500)
ABSOLUTE LYMPHOCYTES: 1623 CELLS/UL (ref 850–3900)
ABSOLUTE MONOCYTES: 677 CELLS/UL (ref 200–950)
ABSOLUTE NEUTROPHILS: 2712 CELLS/UL (ref 1500–7800)
ALBUMIN/GLOBULIN RATIO: 1.6 (CALC) (ref 1–2.5)
ALBUMIN: 4.3 G/DL (ref 3.6–5.1)
ALKALINE PHOSPHATASE: 75 U/L (ref 36–130)
ALT: 34 U/L (ref 9–46)
AST: 25 U/L (ref 10–40)
BASOPHILS: 1.6 %
BILIRUBIN, TOTAL: 0.6 MG/DL (ref 0.2–1.2)
BUN: 10 MG/DL (ref 7–25)
CALCIUM: 9.6 MG/DL (ref 8.6–10.3)
CARBON DIOXIDE: 29 MMOL/L (ref 20–32)
CHLORIDE: 100 MMOL/L (ref 98–110)
CHOL/HDLC RATIO: 4.6 (CALC)
CHOLESTEROL, TOTAL: 183 MG/DL
CREATININE: 0.91 MG/DL (ref 0.6–1.29)
EGFR: 105 ML/MIN/1.73M2
EOSINOPHILS: 7.3 %
GLOBULIN: 2.7 G/DL (CALC) (ref 1.9–3.7)
GLUCOSE: 94 MG/DL (ref 65–99)
HDL CHOLESTEROL: 40 MG/DL
HEMATOCRIT: 46.9 % (ref 38.5–50)
HEMOGLOBIN A1C: 5.2 % OF TOTAL HGB
HEMOGLOBIN: 15.4 G/DL (ref 13.2–17.1)
LDL-CHOLESTEROL: 111 MG/DL (CALC)
LYMPHOCYTES: 29.5 %
MCH: 28.6 PG (ref 27–33)
MCHC: 32.8 G/DL (ref 32–36)
MCV: 87 FL (ref 80–100)
MONOCYTES: 12.3 %
MPV: 9.6 FL (ref 7.5–12.5)
NEUTROPHILS: 49.3 %
NON-HDL CHOLESTEROL: 143 MG/DL (CALC)
PLATELET COUNT: 277 THOUSAND/UL (ref 140–400)
POTASSIUM: 4.1 MMOL/L (ref 3.5–5.3)
PROTEIN, TOTAL: 7 G/DL (ref 6.1–8.1)
RDW: 12.3 % (ref 11–15)
RED BLOOD CELL COUNT: 5.39 MILLION/UL (ref 4.2–5.8)
SODIUM: 137 MMOL/L (ref 135–146)
TRIGLYCERIDES: 196 MG/DL
WHITE BLOOD CELL COUNT: 5.5 THOUSAND/UL (ref 3.8–10.8)

## 2022-10-14 ENCOUNTER — OFFICE VISIT (OUTPATIENT)
Dept: INTERNAL MEDICINE CLINIC | Facility: CLINIC | Age: 47
End: 2022-10-14
Payer: COMMERCIAL

## 2022-10-14 VITALS
WEIGHT: 202.63 LBS | DIASTOLIC BLOOD PRESSURE: 82 MMHG | BODY MASS INDEX: 30.01 KG/M2 | SYSTOLIC BLOOD PRESSURE: 114 MMHG | HEIGHT: 69 IN | RESPIRATION RATE: 18 BRPM

## 2022-10-14 DIAGNOSIS — E78.5 DYSLIPIDEMIA: ICD-10-CM

## 2022-10-14 DIAGNOSIS — Z12.11 SCREENING FOR MALIGNANT NEOPLASM OF COLON: ICD-10-CM

## 2022-10-14 DIAGNOSIS — Z00.00 WELLNESS EXAMINATION: Primary | ICD-10-CM

## 2022-10-14 DIAGNOSIS — R10.13 DYSPEPSIA: ICD-10-CM

## 2022-10-14 DIAGNOSIS — N20.0 NEPHROLITHIASIS: ICD-10-CM

## 2022-10-14 PROCEDURE — 90715 TDAP VACCINE 7 YRS/> IM: CPT | Performed by: FAMILY MEDICINE

## 2022-10-14 PROCEDURE — 3008F BODY MASS INDEX DOCD: CPT | Performed by: FAMILY MEDICINE

## 2022-10-14 PROCEDURE — 3074F SYST BP LT 130 MM HG: CPT | Performed by: FAMILY MEDICINE

## 2022-10-14 PROCEDURE — 90471 IMMUNIZATION ADMIN: CPT | Performed by: FAMILY MEDICINE

## 2022-10-14 PROCEDURE — 3079F DIAST BP 80-89 MM HG: CPT | Performed by: FAMILY MEDICINE

## 2022-10-14 PROCEDURE — 99396 PREV VISIT EST AGE 40-64: CPT | Performed by: FAMILY MEDICINE

## 2022-11-08 RX ORDER — FAMOTIDINE 40 MG/1
40 TABLET, FILM COATED ORAL DAILY
Qty: 90 TABLET | Refills: 1 | Status: SHIPPED | OUTPATIENT
Start: 2022-11-08

## 2022-11-22 RX ORDER — PRAVASTATIN SODIUM 20 MG
20 TABLET ORAL DAILY
Qty: 90 TABLET | Refills: 0 | Status: SHIPPED | OUTPATIENT
Start: 2022-11-22

## 2022-12-08 PROBLEM — K21.9 GASTROESOPHAGEAL REFLUX DISEASE: Status: ACTIVE | Noted: 2022-12-08

## 2022-12-08 PROBLEM — K76.0 FATTY LIVER: Status: ACTIVE | Noted: 2022-12-08

## 2023-01-24 ENCOUNTER — OFFICE VISIT (OUTPATIENT)
Dept: INTERNAL MEDICINE CLINIC | Facility: CLINIC | Age: 48
End: 2023-01-24
Payer: COMMERCIAL

## 2023-01-24 ENCOUNTER — LAB ENCOUNTER (OUTPATIENT)
Dept: LAB | Facility: HOSPITAL | Age: 48
End: 2023-01-24
Attending: FAMILY MEDICINE
Payer: COMMERCIAL

## 2023-01-24 VITALS
HEART RATE: 78 BPM | RESPIRATION RATE: 18 BRPM | OXYGEN SATURATION: 100 % | HEIGHT: 69 IN | BODY MASS INDEX: 29.98 KG/M2 | SYSTOLIC BLOOD PRESSURE: 128 MMHG | WEIGHT: 202.38 LBS | DIASTOLIC BLOOD PRESSURE: 82 MMHG

## 2023-01-24 DIAGNOSIS — M79.662 PAIN OF LEFT CALF: ICD-10-CM

## 2023-01-24 DIAGNOSIS — K43.6 VENTRAL HERNIA WITH OBSTRUCTION AND WITHOUT GANGRENE: ICD-10-CM

## 2023-01-24 DIAGNOSIS — R10.9 ABDOMINAL WALL PAIN: Primary | ICD-10-CM

## 2023-01-24 LAB — D DIMER PPP FEU-MCNC: <0.27 UG/ML FEU (ref ?–0.5)

## 2023-01-24 PROCEDURE — 36415 COLL VENOUS BLD VENIPUNCTURE: CPT | Performed by: FAMILY MEDICINE

## 2023-01-24 PROCEDURE — 3074F SYST BP LT 130 MM HG: CPT | Performed by: FAMILY MEDICINE

## 2023-01-24 PROCEDURE — 99214 OFFICE O/P EST MOD 30 MIN: CPT | Performed by: FAMILY MEDICINE

## 2023-01-24 PROCEDURE — 3008F BODY MASS INDEX DOCD: CPT | Performed by: FAMILY MEDICINE

## 2023-01-24 PROCEDURE — 85379 FIBRIN DEGRADATION QUANT: CPT | Performed by: FAMILY MEDICINE

## 2023-01-24 PROCEDURE — 3079F DIAST BP 80-89 MM HG: CPT | Performed by: FAMILY MEDICINE

## 2023-01-30 ENCOUNTER — OFFICE VISIT (OUTPATIENT)
Facility: LOCATION | Age: 48
End: 2023-01-30
Payer: COMMERCIAL

## 2023-01-30 VITALS — TEMPERATURE: 99 F | HEART RATE: 86 BPM

## 2023-01-30 DIAGNOSIS — K43.9 VENTRAL HERNIA WITHOUT OBSTRUCTION OR GANGRENE: Primary | ICD-10-CM

## 2023-01-31 DIAGNOSIS — K43.6 VENTRAL HERNIA WITH OBSTRUCTION, WITHOUT GANGRENE: Primary | ICD-10-CM

## 2023-02-01 DIAGNOSIS — K43.6 VENTRAL HERNIA WITH OBSTRUCTION AND WITHOUT GANGRENE: Primary | ICD-10-CM

## 2023-02-13 ENCOUNTER — OFFICE VISIT (OUTPATIENT)
Dept: INTERNAL MEDICINE CLINIC | Facility: CLINIC | Age: 48
End: 2023-02-13
Payer: COMMERCIAL

## 2023-02-13 VITALS
RESPIRATION RATE: 18 BRPM | WEIGHT: 204 LBS | BODY MASS INDEX: 30.21 KG/M2 | DIASTOLIC BLOOD PRESSURE: 82 MMHG | SYSTOLIC BLOOD PRESSURE: 132 MMHG | HEIGHT: 69 IN

## 2023-02-13 DIAGNOSIS — K43.6 VENTRAL HERNIA WITH OBSTRUCTION AND WITHOUT GANGRENE: ICD-10-CM

## 2023-02-13 DIAGNOSIS — H69.81 DYSFUNCTION OF RIGHT EUSTACHIAN TUBE: ICD-10-CM

## 2023-02-13 DIAGNOSIS — H93.8X1 EAR FULLNESS, RIGHT: Primary | ICD-10-CM

## 2023-02-13 PROCEDURE — 99213 OFFICE O/P EST LOW 20 MIN: CPT | Performed by: FAMILY MEDICINE

## 2023-02-13 PROCEDURE — 3008F BODY MASS INDEX DOCD: CPT | Performed by: FAMILY MEDICINE

## 2023-02-13 PROCEDURE — 3079F DIAST BP 80-89 MM HG: CPT | Performed by: FAMILY MEDICINE

## 2023-02-13 PROCEDURE — 3075F SYST BP GE 130 - 139MM HG: CPT | Performed by: FAMILY MEDICINE

## 2023-02-14 ENCOUNTER — HOSPITAL ENCOUNTER (OUTPATIENT)
Dept: ULTRASOUND IMAGING | Age: 48
Discharge: HOME | End: 2023-02-14
Attending: INTERNAL MEDICINE
Payer: COMMERCIAL

## 2023-02-14 DIAGNOSIS — K76.0 FATTY LIVER: ICD-10-CM

## 2023-02-14 DIAGNOSIS — R10.11 RUQ PAIN: ICD-10-CM

## 2023-02-14 PROCEDURE — 76981 USE PARENCHYMA: CPT | Performed by: INTERNAL MEDICINE

## 2023-02-14 PROCEDURE — 76700 US EXAM ABDOM COMPLETE: CPT | Performed by: INTERNAL MEDICINE

## 2023-02-22 RX ORDER — PRAVASTATIN SODIUM 20 MG
TABLET ORAL
Qty: 90 TABLET | Refills: 0 | Status: SHIPPED | OUTPATIENT
Start: 2023-02-22

## 2023-02-22 NOTE — TELEPHONE ENCOUNTER
Last visit- 10/14/2022 cpe seen by [facility]    Last refill- 11/22/2022 pravastatin 20mg QTY90 0R    Last labs- 10/07/2022 hemoglobin a1c, lipid, cmpm cbc   Future Appointments   Date Time Provider Nahun Cisneros   6/20/2023  7:00 AM Luan Jacob MD [facility] GI   6/20/2023  7:15 AM Luan Jacob MD [facility]       Per Protocol- Passed

## 2023-04-06 DIAGNOSIS — K43.9 VENTRAL HERNIA WITHOUT OBSTRUCTION OR GANGRENE: Primary | ICD-10-CM

## 2023-04-27 ENCOUNTER — TELEPHONE (OUTPATIENT)
Facility: LOCATION | Age: 48
End: 2023-04-27

## 2023-04-27 ENCOUNTER — ANESTHESIA EVENT (OUTPATIENT)
Dept: SURGERY | Facility: HOSPITAL | Age: 48
End: 2023-04-27
Payer: COMMERCIAL

## 2023-04-27 ENCOUNTER — ANESTHESIA (OUTPATIENT)
Dept: SURGERY | Facility: HOSPITAL | Age: 48
End: 2023-04-27
Payer: COMMERCIAL

## 2023-04-27 ENCOUNTER — HOSPITAL ENCOUNTER (OUTPATIENT)
Facility: HOSPITAL | Age: 48
Setting detail: HOSPITAL OUTPATIENT SURGERY
Discharge: HOME | End: 2023-04-27
Attending: SURGERY | Admitting: SURGERY
Payer: COMMERCIAL

## 2023-04-27 VITALS
WEIGHT: 196.81 LBS | RESPIRATION RATE: 15 BRPM | BODY MASS INDEX: 29.15 KG/M2 | OXYGEN SATURATION: 100 % | DIASTOLIC BLOOD PRESSURE: 90 MMHG | SYSTOLIC BLOOD PRESSURE: 144 MMHG | HEART RATE: 65 BPM | HEIGHT: 69 IN | TEMPERATURE: 98 F

## 2023-04-27 DIAGNOSIS — K43.9 VENTRAL HERNIA WITHOUT OBSTRUCTION OR GANGRENE: ICD-10-CM

## 2023-04-27 PROCEDURE — 0JB80ZZ EXCISION OF ABDOMEN SUBCUTANEOUS TISSUE AND FASCIA, OPEN APPROACH: ICD-10-PCS | Performed by: SURGERY

## 2023-04-27 PROCEDURE — 88304 TISSUE EXAM BY PATHOLOGIST: CPT | Performed by: SURGERY

## 2023-04-27 RX ORDER — ONDANSETRON 2 MG/ML
4 INJECTION INTRAMUSCULAR; INTRAVENOUS EVERY 6 HOURS PRN
Status: DISCONTINUED | OUTPATIENT
Start: 2023-04-27 | End: 2023-04-27

## 2023-04-27 RX ORDER — ACETAMINOPHEN 500 MG
1000 TABLET ORAL ONCE
Status: DISCONTINUED | OUTPATIENT
Start: 2023-04-27 | End: 2023-04-27 | Stop reason: HOSPADM

## 2023-04-27 RX ORDER — IBUPROFEN 800 MG/1
800 TABLET ORAL EVERY 8 HOURS PRN
Qty: 10 TABLET | Refills: 0 | Status: SHIPPED | OUTPATIENT
Start: 2023-04-27

## 2023-04-27 RX ORDER — DIPHENHYDRAMINE HYDROCHLORIDE 50 MG/ML
12.5 INJECTION INTRAMUSCULAR; INTRAVENOUS AS NEEDED
Status: DISCONTINUED | OUTPATIENT
Start: 2023-04-27 | End: 2023-04-27

## 2023-04-27 RX ORDER — CEFAZOLIN SODIUM 1 G/3ML
INJECTION, POWDER, FOR SOLUTION INTRAMUSCULAR; INTRAVENOUS AS NEEDED
Status: DISCONTINUED | OUTPATIENT
Start: 2023-04-27 | End: 2023-04-27 | Stop reason: SURG

## 2023-04-27 RX ORDER — MEPERIDINE HYDROCHLORIDE 25 MG/ML
12.5 INJECTION INTRAMUSCULAR; INTRAVENOUS; SUBCUTANEOUS AS NEEDED
Status: DISCONTINUED | OUTPATIENT
Start: 2023-04-27 | End: 2023-04-27

## 2023-04-27 RX ORDER — NEOSTIGMINE METHYLSULFATE 1 MG/ML
INJECTION, SOLUTION INTRAVENOUS AS NEEDED
Status: DISCONTINUED | OUTPATIENT
Start: 2023-04-27 | End: 2023-04-27 | Stop reason: SURG

## 2023-04-27 RX ORDER — HYDROCODONE BITARTRATE AND ACETAMINOPHEN 5; 325 MG/1; MG/1
1 TABLET ORAL ONCE AS NEEDED
Status: COMPLETED | OUTPATIENT
Start: 2023-04-27 | End: 2023-04-27

## 2023-04-27 RX ORDER — KETOROLAC TROMETHAMINE 30 MG/ML
INJECTION, SOLUTION INTRAMUSCULAR; INTRAVENOUS AS NEEDED
Status: DISCONTINUED | OUTPATIENT
Start: 2023-04-27 | End: 2023-04-27 | Stop reason: SURG

## 2023-04-27 RX ORDER — SCOLOPAMINE TRANSDERMAL SYSTEM 1 MG/1
1 PATCH, EXTENDED RELEASE TRANSDERMAL ONCE
Status: DISCONTINUED | OUTPATIENT
Start: 2023-04-27 | End: 2023-04-27 | Stop reason: HOSPADM

## 2023-04-27 RX ORDER — ONDANSETRON 2 MG/ML
INJECTION INTRAMUSCULAR; INTRAVENOUS AS NEEDED
Status: DISCONTINUED | OUTPATIENT
Start: 2023-04-27 | End: 2023-04-27 | Stop reason: SURG

## 2023-04-27 RX ORDER — CLINDAMYCIN PHOSPHATE 900 MG/50ML
900 INJECTION INTRAVENOUS ONCE
Status: DISCONTINUED | OUTPATIENT
Start: 2023-04-27 | End: 2023-04-27 | Stop reason: HOSPADM

## 2023-04-27 RX ORDER — HYDROCODONE BITARTRATE AND ACETAMINOPHEN 5; 325 MG/1; MG/1
2 TABLET ORAL ONCE AS NEEDED
Status: COMPLETED | OUTPATIENT
Start: 2023-04-27 | End: 2023-04-27

## 2023-04-27 RX ORDER — HYDROMORPHONE HYDROCHLORIDE 1 MG/ML
0.2 INJECTION, SOLUTION INTRAMUSCULAR; INTRAVENOUS; SUBCUTANEOUS EVERY 5 MIN PRN
Status: DISCONTINUED | OUTPATIENT
Start: 2023-04-27 | End: 2023-04-27

## 2023-04-27 RX ORDER — LIDOCAINE HYDROCHLORIDE 10 MG/ML
INJECTION, SOLUTION EPIDURAL; INFILTRATION; INTRACAUDAL; PERINEURAL AS NEEDED
Status: DISCONTINUED | OUTPATIENT
Start: 2023-04-27 | End: 2023-04-27 | Stop reason: SURG

## 2023-04-27 RX ORDER — SODIUM CHLORIDE, SODIUM LACTATE, POTASSIUM CHLORIDE, CALCIUM CHLORIDE 600; 310; 30; 20 MG/100ML; MG/100ML; MG/100ML; MG/100ML
INJECTION, SOLUTION INTRAVENOUS CONTINUOUS
Status: DISCONTINUED | OUTPATIENT
Start: 2023-04-27 | End: 2023-04-27

## 2023-04-27 RX ORDER — HYDROCODONE BITARTRATE AND ACETAMINOPHEN 5; 325 MG/1; MG/1
1 TABLET ORAL EVERY 6 HOURS PRN
Qty: 5 TABLET | Refills: 0 | Status: SHIPPED | OUTPATIENT
Start: 2023-04-27

## 2023-04-27 RX ORDER — BUPIVACAINE HYDROCHLORIDE AND EPINEPHRINE 5; 5 MG/ML; UG/ML
INJECTION, SOLUTION EPIDURAL; INTRACAUDAL; PERINEURAL AS NEEDED
Status: DISCONTINUED | OUTPATIENT
Start: 2023-04-27 | End: 2023-04-27 | Stop reason: HOSPADM

## 2023-04-27 RX ORDER — ROCURONIUM BROMIDE 10 MG/ML
INJECTION, SOLUTION INTRAVENOUS AS NEEDED
Status: DISCONTINUED | OUTPATIENT
Start: 2023-04-27 | End: 2023-04-27 | Stop reason: SURG

## 2023-04-27 RX ORDER — HYDROMORPHONE HYDROCHLORIDE 1 MG/ML
0.4 INJECTION, SOLUTION INTRAMUSCULAR; INTRAVENOUS; SUBCUTANEOUS EVERY 5 MIN PRN
Status: DISCONTINUED | OUTPATIENT
Start: 2023-04-27 | End: 2023-04-27

## 2023-04-27 RX ORDER — HYDROMORPHONE HYDROCHLORIDE 1 MG/ML
0.6 INJECTION, SOLUTION INTRAMUSCULAR; INTRAVENOUS; SUBCUTANEOUS EVERY 5 MIN PRN
Status: DISCONTINUED | OUTPATIENT
Start: 2023-04-27 | End: 2023-04-27

## 2023-04-27 RX ORDER — PROCHLORPERAZINE EDISYLATE 5 MG/ML
5 INJECTION INTRAMUSCULAR; INTRAVENOUS EVERY 8 HOURS PRN
Status: DISCONTINUED | OUTPATIENT
Start: 2023-04-27 | End: 2023-04-27

## 2023-04-27 RX ORDER — MIDAZOLAM HYDROCHLORIDE 1 MG/ML
1 INJECTION INTRAMUSCULAR; INTRAVENOUS EVERY 5 MIN PRN
Status: DISCONTINUED | OUTPATIENT
Start: 2023-04-27 | End: 2023-04-27

## 2023-04-27 RX ORDER — HEPARIN SODIUM 5000 [USP'U]/ML
5000 INJECTION, SOLUTION INTRAVENOUS; SUBCUTANEOUS ONCE
Status: COMPLETED | OUTPATIENT
Start: 2023-04-27 | End: 2023-04-27

## 2023-04-27 RX ORDER — GLYCOPYRROLATE 0.2 MG/ML
INJECTION, SOLUTION INTRAMUSCULAR; INTRAVENOUS AS NEEDED
Status: DISCONTINUED | OUTPATIENT
Start: 2023-04-27 | End: 2023-04-27 | Stop reason: SURG

## 2023-04-27 RX ORDER — ACETAMINOPHEN 500 MG
1000 TABLET ORAL ONCE AS NEEDED
Status: COMPLETED | OUTPATIENT
Start: 2023-04-27 | End: 2023-04-27

## 2023-04-27 RX ORDER — NALOXONE HYDROCHLORIDE 0.4 MG/ML
80 INJECTION, SOLUTION INTRAMUSCULAR; INTRAVENOUS; SUBCUTANEOUS AS NEEDED
Status: DISCONTINUED | OUTPATIENT
Start: 2023-04-27 | End: 2023-04-27

## 2023-04-27 RX ORDER — LIDOCAINE HYDROCHLORIDE 40 MG/ML
INJECTION, SOLUTION RETROBULBAR; TOPICAL AS NEEDED
Status: DISCONTINUED | OUTPATIENT
Start: 2023-04-27 | End: 2023-04-27 | Stop reason: SURG

## 2023-04-27 RX ORDER — DEXAMETHASONE SODIUM PHOSPHATE 4 MG/ML
VIAL (ML) INJECTION AS NEEDED
Status: DISCONTINUED | OUTPATIENT
Start: 2023-04-27 | End: 2023-04-27 | Stop reason: SURG

## 2023-04-27 RX ADMIN — NEOSTIGMINE METHYLSULFATE 4 MG: 1 INJECTION, SOLUTION INTRAVENOUS at 10:50:00

## 2023-04-27 RX ADMIN — LIDOCAINE HYDROCHLORIDE 50 MG: 10 INJECTION, SOLUTION EPIDURAL; INFILTRATION; INTRACAUDAL; PERINEURAL at 10:19:00

## 2023-04-27 RX ADMIN — DEXAMETHASONE SODIUM PHOSPHATE 8 MG: 4 MG/ML VIAL (ML) INJECTION at 10:36:00

## 2023-04-27 RX ADMIN — SODIUM CHLORIDE, SODIUM LACTATE, POTASSIUM CHLORIDE, CALCIUM CHLORIDE: 600; 310; 30; 20 INJECTION, SOLUTION INTRAVENOUS at 10:59:00

## 2023-04-27 RX ADMIN — GLYCOPYRROLATE 0.6 MG: 0.2 INJECTION, SOLUTION INTRAMUSCULAR; INTRAVENOUS at 10:50:00

## 2023-04-27 RX ADMIN — LIDOCAINE HYDROCHLORIDE 4 ML: 40 INJECTION, SOLUTION RETROBULBAR; TOPICAL at 10:20:00

## 2023-04-27 RX ADMIN — ROCURONIUM BROMIDE 25 MG: 10 INJECTION, SOLUTION INTRAVENOUS at 10:19:00

## 2023-04-27 RX ADMIN — KETOROLAC TROMETHAMINE 30 MG: 30 INJECTION, SOLUTION INTRAMUSCULAR; INTRAVENOUS at 10:36:00

## 2023-04-27 RX ADMIN — ONDANSETRON 4 MG: 2 INJECTION INTRAMUSCULAR; INTRAVENOUS at 10:36:00

## 2023-04-27 RX ADMIN — SODIUM CHLORIDE, SODIUM LACTATE, POTASSIUM CHLORIDE, CALCIUM CHLORIDE: 600; 310; 30; 20 INJECTION, SOLUTION INTRAVENOUS at 10:17:00

## 2023-04-27 RX ADMIN — CEFAZOLIN SODIUM 2 G: 1 INJECTION, POWDER, FOR SOLUTION INTRAMUSCULAR; INTRAVENOUS at 10:30:00

## 2023-04-27 NOTE — TELEPHONE ENCOUNTER
Pt called stating if he can get hydrocodone and ibuprofen 800 mg sent to [facility] in [place].     To please call back when medication's are sent to the pharmacy     Call back pt's wife cell phone# [phone]

## 2023-04-27 NOTE — ANESTHESIA POSTPROCEDURE EVALUATION
Shireen Patient Status:  Hospital Outpatient Surgery   Age/Gender 56year old male MRN [id number]   [facility] SURGERY Attending Gurpreet Lora MD   [facility] Day # 0 PCP Tiffani Broussard MD       Anesthesia Post-op Note    Exicison of abdominal wall lipoma    Procedure Summary     Date: 04/27/23 Room / Location: [facility] MAIN OR [address] [place] [address] MAIN OR    Anesthesia Start: 1992 Anesthesia Stop: 0694    Procedure: Chico Perera of abdominal wall lipoma (Abdomen) Diagnosis:       Ventral hernia without obstruction or gangrene      (Ventral hernia without obstruction or gangrene [K43.9])    Surgeons: Gurpreet Lora MD Anesthesiologist: Patrick Parks MD    Anesthesia Type: general ASA Status: 2          Anesthesia Type: general    Vitals Value Taken Time   BP 97/82 04/27/23 1103   Temp 97.5 04/27/23 1105   Pulse 82 04/27/23 1105   Resp 18 04/27/23 1105   SpO2 99 % 04/27/23 1105   Vitals shown include unvalidated device data. Patient Location: PACU    Anesthesia Type: general    Airway Patency: patent    Postop Pain Control: adequate    Mental Status: mildly sedated but able to meaningfully participate in the post-anesthesia evaluation    Nausea/Vomiting: none    Cardiopulmonary/Hydration status: stable euvolemic    Complications: no apparent anesthesia related complications    Postop vital signs: stable    Dental Exam: Unchanged from Preop    Patient to be discharged from PACU when criteria met.

## 2023-04-27 NOTE — DISCHARGE INSTRUCTIONS
Home Care Instructions  Minor Surgery  Dr. Cal Bhatia  For post-operative pain control the mediations are usually over the counter preparations such as Advil and Tylenol. For severe pain the patient may overlap Advil with Tylenol. The patient can do this by taking two Tylenol, then three hours later taking two Advil, then three hours later taking Tylenol again. Please ask your surgeon before resuming blood thinners such as aspirin, Plavix or Coumadin. All other home medications may be resumed as scheduled. Generally aspirin is avoided for ten days. DIET  The patient may resume a general diet immediately. There should be no alcohol consumption in the immediate recover time period. If the patient was sedated for the procedure the first meal should be light. WOUND CARE  The top dressing may be removed the day after surgery. This includes the gauze, tape and band-aids if they are present. Do not remove the steri-strips or butterfly tapes that are white and adherent to the skin. The steri-strips will eventually peel up at the ends and at this point they may be removed. This is usually seven to ten days after surgery. The patient may shower the day after surgery. There is no need to cover the incisions, and all top gauze type dressing should be removed prior to showering. Soap can get on the wounds but do not scrub over the wounds. No hair dye or chemicals of any kind should get in the wounds. Avoid tub baths for two weeks. Most wounds will be closed with dissolving suture underneath the skin. These sutures will dissolve on their own. The doctor or nurse will remove any visible sutures, or staples, in the office. ACTIVITY  The patient may ride in a car but should not drive the car for at least overnight if sedation was used. If no sedation was used the patient may drive immediately. The patient may return to work the next day.   Avoid any activity that could lead to the wound getting elbowed or bumped. Avoid bending, pushing, pulling and lifting anything heavier than 25 pounds for two weeks. Patients should seek further activity limits at the time of their appointment. No extreme sports for two weeks. APPOINTMENT  Please call our office today for an appointment within five to ten days of discharge. Any fever greater than 100.5, chills, nausea, vomiting, or severe diarrhea please call our office. If the wound turns red, hot, swollen, becomes increasingly painful, or drains pus call us immediately at [phone]. For life threatening emergencies call 911. For non-emergent care please call our office after 8:30 a.m. Monday through Friday. The number listed above is our office number. Our phone automatically switches to out answering service if we are not there. Please do not use the emergency room for non-urgent care. Thank you for entrusting us with your care.   [facility]--General Surgery     You received a drug called Toradol which is an Anti Inflammatory at:1036am  If you are allowed to take Anti inflammatories:    Do not take any Anti Inflammatory like Motrin, Aleve or Ibuprophen until after:436pm  Please report any suspected allergic reactions or bleeding issues to your doctor

## 2023-04-27 NOTE — TELEPHONE ENCOUNTER
Pt is calling to request prescritpion be sent to [facility] in [place] on [address].      Please call pt once order is placed  Best callback number is [phone]

## 2023-04-27 NOTE — OPERATIVE REPORT
[facility]  Op Note    [employer] [address] Location: OR   [facility] [id number] MRN [id number]   Admission Date 4/27/2023 Operation Date 4/27/2023   Attending Physician Anabell Duarte MD Operating Physician Meño Ochoa MD     DATE OF OPERATION:  4/27/2023  PREOPERATIVE DIAGNOSIS: Left periumbilical subcutaneous mass, possible ventral hernia versus lipoma. POSTOPERATIVE DIAGNOSIS: Abdominal wall lipoma. PROCEDURE PERFORMED: Excision of 4.7 cm abdominal wall lipoma with 2.0 cm layered closure  SURGEON:  Meño Ochoa MD  ASSISTANT: Sergio Canales MD  ANESTHESIA: General.    SPECIMEN: None. BLOOD LOSS: 5 mL. COMPLICATIONS: None. INDICATIONS FOR PROCEDURE: The patient is a 42-year-old gentleman who noticed a painful lump by his umbilicus after beginning a new workout regimen. A subcutaneous mass without obvious fascial defect was appreciated on physical exam.  The patient was offered ventral hernia repair with mesh versus excision of this possible lipoma. The risks, benefits, and alternatives of this were discussed in detail with the patient. Risks included, but were not limited to, recurrence of the hernia, bleeding, wound infection, and injury to the hernia contents. We also discussed possible recurrence of the lipoma. The patient was agreeable to proceed with the operation. OPERATIVE TECHNIQUE: After informed consent was obtained, patient was taken to the operating room and placed in supine position. General anesthesia was induced. The abdomen was then prepped and draped in the usual sterile fashion. An incision was made over the area of greatest prominence. Cautery was used to obtain hemostasis and to continue the dissection down to the fascial plane. Firm lipomatous tissue was encountered in the subcutaneous space. This tissue was grasped and excised using Metzenbaums. The fascia thoroughly was inspected, and no defects were appreciated.   The wound was irrigated with normal saline. The incision was closed in 2 layers, using a 3-0 Vicryl in the deep layer and a 4-0 Vicryl in subcuticular skin. The surgical site was infiltrated with local anesthetic. Steri-Strips and Mastisol were placed across the incision, as well as a sterile dressing. The patient tolerated the procedure well, was extubated in the OR, and went to the PACU in good condition.     Reagan Estrada MD

## 2023-04-27 NOTE — ANESTHESIA PROCEDURE NOTES
Airway  Date/Time: 4/27/2023 10:21 AM  Urgency: elective      General Information and Staff    Patient location during procedure: OR  Anesthesiologist: Ermias Rodriges MD  Resident/CRNA: Taylor Delacruz CRNA  Performed: CRNA   Performed by: Taylor Delacruz CRNA  Authorized by: Ermias Rodriges MD      Indications and Patient Condition  Indications for airway management: anesthesia  Sedation level: deep  Preoxygenated: yes  Patient position: sniffing  Mask difficulty assessment: 1 - vent by mask    Final Airway Details  Final airway type: endotracheal airway      Successful airway: ETT  Cuffed: yes   Successful intubation technique: direct laryngoscopy  Endotracheal tube insertion site: oral  Blade: Tiara  Blade size: #3  ETT size (mm): 7.0    Cormack-Lehane Classification: grade I - full view of glottis  Placement verified by: chest auscultation and capnometry   Measured from: lips  ETT to lips (cm): 22  Number of attempts at approach: 1

## 2023-04-27 NOTE — TELEPHONE ENCOUNTER
Left message to notify patient the scripts were sent to [facility] in [place] [address].

## 2023-05-09 ENCOUNTER — OFFICE VISIT (OUTPATIENT)
Facility: LOCATION | Age: 48
End: 2023-05-09

## 2023-05-09 VITALS — HEART RATE: 75 BPM | TEMPERATURE: 97 F

## 2023-05-09 DIAGNOSIS — Z86.018 S/P EXCISION OF LIPOMA: Primary | ICD-10-CM

## 2023-05-09 DIAGNOSIS — Z98.890 POST-OPERATIVE STATE: ICD-10-CM

## 2023-05-09 DIAGNOSIS — Z98.890 S/P EXCISION OF LIPOMA: Primary | ICD-10-CM

## 2023-05-09 PROCEDURE — 99024 POSTOP FOLLOW-UP VISIT: CPT

## 2023-05-31 RX ORDER — PRAVASTATIN SODIUM 20 MG
TABLET ORAL
Qty: 90 TABLET | Refills: 1 | Status: SHIPPED | OUTPATIENT
Start: 2023-05-31

## 2023-05-31 NOTE — TELEPHONE ENCOUNTER
Last wellness 10/14/22    Seen for 2 acute visits between    Last lipid 10/2022    No future visits with us

## 2023-06-10 ENCOUNTER — PATIENT MESSAGE (OUTPATIENT)
Dept: INTERNAL MEDICINE CLINIC | Facility: CLINIC | Age: 48
End: 2023-06-10

## 2023-06-12 NOTE — TELEPHONE ENCOUNTER
From: Nicolas Barry  To: Jabari Francisco MD  Sent: 6/10/2023 12:07 PM CDT  Subject: Famotidine Refill    Looks like my Famotidine 20MG refill is no longer showing up on my meds. Please add that back and set up for refills from [facility] on file.

## 2023-06-13 RX ORDER — FAMOTIDINE 20 MG/1
20 TABLET, FILM COATED ORAL 2 TIMES DAILY
Qty: 180 TABLET | Refills: 1
Start: 2023-06-13 | End: 2023-09-11

## 2023-09-11 PROBLEM — K63.5 COLON POLYP: Status: ACTIVE | Noted: 2023-09-11

## 2023-09-11 PROBLEM — K20.80 CORROSIVE ESOPHAGITIS: Status: ACTIVE | Noted: 2023-09-11

## 2023-09-11 PROBLEM — R10.11 ABDOMINAL PAIN, RIGHT UPPER QUADRANT: Status: ACTIVE | Noted: 2023-09-11

## 2023-09-11 PROBLEM — D12.8 BENIGN NEOPLASM OF RECTUM AND ANAL CANAL: Status: ACTIVE | Noted: 2023-09-11

## 2023-09-11 PROBLEM — D12.9 BENIGN NEOPLASM OF RECTUM AND ANAL CANAL: Status: ACTIVE | Noted: 2023-09-11

## 2023-09-11 PROBLEM — D12.5 BENIGN NEOPLASM OF SIGMOID COLON: Status: ACTIVE | Noted: 2023-09-11

## 2023-09-11 PROBLEM — K64.8 INTERNAL HEMORRHOIDS: Status: ACTIVE | Noted: 2023-09-11

## 2023-09-11 PROBLEM — Z12.11 SPECIAL SCREENING FOR MALIGNANT NEOPLASM OF COLON: Status: ACTIVE | Noted: 2023-09-11

## 2023-09-11 RX ORDER — TRIAMCINOLONE ACETONIDE 1 MG/G
CREAM TOPICAL 2 TIMES DAILY PRN
Qty: 60 G | Refills: 3 | Status: SHIPPED | OUTPATIENT
Start: 2023-09-11

## 2023-09-11 RX ORDER — CLOTRIMAZOLE AND BETAMETHASONE DIPROPIONATE 10; .64 MG/G; MG/G
1 CREAM TOPICAL 2 TIMES DAILY PRN
Qty: 2 EACH | Refills: 3 | Status: SHIPPED | OUTPATIENT
Start: 2023-09-11

## 2023-09-11 NOTE — TELEPHONE ENCOUNTER
Last filled for patient in 1/2022    Last wellness visit 10/14/2022 with [facility]

## 2023-09-12 ENCOUNTER — TELEPHONE (OUTPATIENT)
Dept: INTERNAL MEDICINE CLINIC | Facility: CLINIC | Age: 48
End: 2023-09-12

## 2023-09-23 ENCOUNTER — PATIENT MESSAGE (OUTPATIENT)
Dept: INTERNAL MEDICINE CLINIC | Facility: CLINIC | Age: 48
End: 2023-09-23

## 2023-09-25 NOTE — TELEPHONE ENCOUNTER
From: Deidra Barry  To: Li Brown  Sent: 9/23/2023 7:45 AM CDT  Subject: Paxlovid    I will be traveling to [facility] and [facility] in two weeks and wanted to find out whether I should take regiment of Paxlovid with me in case I catch Covid. I just got the Moderna booster shot yesterday and if you believe I should get it please prescribe to [facility] on file.

## 2023-10-16 ENCOUNTER — HOSPITAL ENCOUNTER (OUTPATIENT)
Age: 48
Discharge: HOME | End: 2023-10-16
Payer: COMMERCIAL

## 2023-10-16 VITALS
BODY MASS INDEX: 28.88 KG/M2 | SYSTOLIC BLOOD PRESSURE: 144 MMHG | HEART RATE: 68 BPM | HEIGHT: 69 IN | RESPIRATION RATE: 18 BRPM | DIASTOLIC BLOOD PRESSURE: 98 MMHG | TEMPERATURE: 98 F | OXYGEN SATURATION: 99 % | WEIGHT: 195 LBS

## 2023-10-16 DIAGNOSIS — H66.93 BILATERAL OTITIS MEDIA, UNSPECIFIED OTITIS MEDIA TYPE: Primary | ICD-10-CM

## 2023-10-16 LAB — SARS-COV-2 RNA RESP QL NAA+PROBE: NOT DETECTED

## 2023-10-16 PROCEDURE — 99213 OFFICE O/P EST LOW 20 MIN: CPT

## 2023-10-16 PROCEDURE — 99203 OFFICE O/P NEW LOW 30 MIN: CPT

## 2023-10-16 RX ORDER — AZITHROMYCIN 250 MG/1
TABLET, FILM COATED ORAL
Qty: 6 TABLET | Refills: 0 | Status: SHIPPED | OUTPATIENT
Start: 2023-10-16 | End: 2023-10-21

## 2023-10-16 RX ORDER — BENZONATATE 100 MG/1
100 CAPSULE ORAL 3 TIMES DAILY PRN
Qty: 30 CAPSULE | Refills: 0 | Status: SHIPPED | OUTPATIENT
Start: 2023-10-16 | End: 2023-11-15

## 2023-10-16 NOTE — ED INITIAL ASSESSMENT (HPI)
C/o cough, cold symptoms since Friday. Just got back from a recent travel [place] on Friday. Home kit Covid test negative yesterday.

## 2023-11-20 ENCOUNTER — OFFICE VISIT (OUTPATIENT)
Dept: INTERNAL MEDICINE CLINIC | Facility: CLINIC | Age: 48
End: 2023-11-20
Payer: COMMERCIAL

## 2023-11-20 VITALS
BODY MASS INDEX: 29.22 KG/M2 | DIASTOLIC BLOOD PRESSURE: 78 MMHG | RESPIRATION RATE: 16 BRPM | TEMPERATURE: 97 F | HEART RATE: 70 BPM | HEIGHT: 70 IN | WEIGHT: 204.13 LBS | SYSTOLIC BLOOD PRESSURE: 116 MMHG | OXYGEN SATURATION: 99 %

## 2023-11-20 DIAGNOSIS — K20.80 CORROSIVE ESOPHAGITIS: ICD-10-CM

## 2023-11-20 DIAGNOSIS — K76.0 FATTY LIVER: ICD-10-CM

## 2023-11-20 DIAGNOSIS — Z00.00 WELLNESS EXAMINATION: Primary | ICD-10-CM

## 2023-11-20 DIAGNOSIS — K21.00 GASTROESOPHAGEAL REFLUX DISEASE WITH ESOPHAGITIS WITHOUT HEMORRHAGE: ICD-10-CM

## 2023-11-20 DIAGNOSIS — E78.5 DYSLIPIDEMIA: ICD-10-CM

## 2023-11-20 RX ORDER — CLOBETASOL PROPIONATE 0.5 MG/G
OINTMENT TOPICAL 2 TIMES DAILY
COMMUNITY

## 2023-11-20 RX ORDER — ATORVASTATIN CALCIUM 20 MG/1
20 TABLET, FILM COATED ORAL NIGHTLY
Qty: 90 TABLET | Refills: 1 | Status: SHIPPED | OUTPATIENT
Start: 2023-11-20

## 2023-12-05 RX ORDER — PRAVASTATIN SODIUM 20 MG
TABLET ORAL
Qty: 90 TABLET | Refills: 1 | OUTPATIENT
Start: 2023-12-05

## 2023-12-05 NOTE — TELEPHONE ENCOUNTER
Dyslipidemia  Will transition to atorvastatin and repeat lipid in 3 months. - atorvastatin 20 MG Oral Tab; Take 1 tablet (20 mg total) by mouth nightly. Dispense: 90 tablet;  Refill: 1  - Lipid Panel

## 2024-01-08 ENCOUNTER — TELEPHONE (OUTPATIENT)
Dept: INTERNAL MEDICINE CLINIC | Facility: CLINIC | Age: 49
End: 2024-01-08

## 2024-01-08 NOTE — TELEPHONE ENCOUNTER
Future Appointments   Date Time Provider Department Center   1/13/2024  8:40 AM Jay Jay Guardado MD [facility]     Pt sched appt for Lower left abdominal pain

## 2024-01-08 NOTE — TELEPHONE ENCOUNTER
Patient states  for about 1 week he has had on and off cramping left lower pelvic area which daily, sometimes worse than others, 5 at it's worst and 3 most of the time, can be sharp.  Pt denies  fever, nausea, vomiting, issues with BM's.  Laying on his back he has no discomfort, but sitting and standing has some.  Pt has an appt on 1/13/24 with AD because there were no appt's with MK until next week and AD used to be his PCP.  TRACEY to AD.  AD, ok for appt on 1/13/24?

## 2024-01-09 NOTE — TELEPHONE ENCOUNTER
I am okay with seeing him, however, history of renal stone and may need to be seen sooner (me or mid level if availability exists). OR can go to UC pending symptom-progression.

## 2024-01-09 NOTE — TELEPHONE ENCOUNTER
Called and spoke w/ pt. Pt stated he is currently out of town in [place], [place] and comes back into town Thursday night. No apts Friday. Notified AD ok with seeing pt but worried about history of kidney stone and may need to be seen sooner at . Pt stated pain has been steady and feels \"pretty good at the moment\". Pt would like to keep apt as scheduled. Notified pt if pain worsens need to go to UC for eval. Pt verbalizes understanding and is agreeable to plan.     FYI AD - Out of town until Thurs night, no apts Friday with you or MK. Pt wants to keep apt Saturday

## 2024-01-15 ENCOUNTER — OFFICE VISIT (OUTPATIENT)
Dept: INTERNAL MEDICINE CLINIC | Facility: CLINIC | Age: 49
End: 2024-01-15
Payer: COMMERCIAL

## 2024-01-15 VITALS
WEIGHT: 208 LBS | SYSTOLIC BLOOD PRESSURE: 126 MMHG | HEART RATE: 75 BPM | OXYGEN SATURATION: 99 % | DIASTOLIC BLOOD PRESSURE: 82 MMHG | RESPIRATION RATE: 16 BRPM | BODY MASS INDEX: 31 KG/M2

## 2024-01-15 DIAGNOSIS — R10.9 ACUTE LEFT FLANK PAIN: Primary | ICD-10-CM

## 2024-01-15 DIAGNOSIS — E78.5 DYSLIPIDEMIA: ICD-10-CM

## 2024-01-15 DIAGNOSIS — Z87.442 HISTORY OF NEPHROLITHIASIS: ICD-10-CM

## 2024-01-15 LAB
APPEARANCE: CLEAR
BILIRUBIN: NEGATIVE
GLUCOSE (URINE DIPSTICK): NEGATIVE MG/DL
KETONES (URINE DIPSTICK): NEGATIVE MG/DL
LEUKOCYTES: NEGATIVE
MULTISTIX LOT#: NORMAL NUMERIC
NITRITE, URINE: NEGATIVE
OCCULT BLOOD: NEGATIVE
PH, URINE: 5.5 (ref 4.5–8)
PROTEIN (URINE DIPSTICK): NEGATIVE MG/DL
SPECIFIC GRAVITY: 1.03 (ref 1–1.03)
UROBILINOGEN,SEMI-QN: 0.2 MG/DL (ref 0–1.9)

## 2024-01-15 NOTE — PROGRESS NOTES
Heidy Barry  6/6/1975    Chief Complaint   Patient presents with    Follow - Up     Rm 7       SUBJECTIVE   Heidy Barry is a 48 year old male who presents as a follow-up.    The patient reports a several-day duration of a a left flank pain with radiation into the left lower abdomen.  His symptoms are described as a generalized dull and aching sensation that is worse with palpation and particular movements.  Since symptom onset there has not been any changes with voiding urine, such as with dysuria, or gross hematuria.  No color in change or odor of urine.  No changes with bowel habits.  He feels his symptom onset was shortly after changing from pravastatin to atorvastatin therapy for which this has been held for approximately 48 hours/2 doses with some improvement in symptoms.  No further acute concerns at this time.    Review of Systems   No f/c/chest pain or sob. No cough. No abd pain/n/v/d. No ha or dizziness. No numbness, tingling, or weakness. No other complaints today.    Current Outpatient Medications   Medication Sig Dispense Refill    clobetasol 0.05 % External Ointment Apply topically 2 (two) times daily.      atorvastatin 20 MG Oral Tab Take 1 tablet (20 mg total) by mouth nightly. 90 tablet 1    docusate sodium (COLACE) 100 MG Oral Cap 1 capsule (100 mg total) daily.      clotrimazole-betamethasone 1-0.05 % External Cream Apply 1 Application  topically 2 (two) times daily as needed. 2 each 3    triamcinolone 0.1 % External Cream Apply topically 2 (two) times daily as needed. 60 g 3    Omeprazole 20 MG Oral Tab EC Take one tablet (20mg) daily for 8 weeks. (Patient taking differently: daily.) 90 tablet 0    Ergocalciferol (VITAMIN D OR) Take by mouth daily.      Fexofenadine-Pseudoephedrine (ALLEGRA-D OR) Take by mouth daily as needed.      Multiple Vitamin (ONE-DAILY MULTI VITAMINS) Oral Tab Take 1 tablet by mouth daily.      Tadalafil (CIALIS) 20 MG Oral Tab Take 1 tablet (20 mg  total) by mouth as needed for Erectile Dysfunction. (Patient not taking: Reported on 1/15/2024) 24 tablet 1      Allergies   Allergen Reactions    Penicillins RASH    Seasonal Coughing      Past Medical History:   Diagnosis Date    Abdominal pain     Over 2 years occasionally    Blood in the stool     Calculus of kidney 08/2022    Constipation     Esophageal reflux     Flatulence/gas pain/belching     Heartburn     Been having it for many years    Hemorrhoids     High cholesterol     Pain with bowel movements     Visual impairment     glasses distance/computer    Wears glasses       Patient Active Problem List   Diagnosis    Dyslipidemia    Nephrolithiasis    Fatty liver    Gastroesophageal reflux disease    Abdominal pain, right upper quadrant    Erosive esophagitis    Special screening for malignant neoplasm of colon    Benign neoplasm of sigmoid colon    Benign neoplasm of rectum    Internal hemorrhoids    Colon polyp      Past Surgical History:   Procedure Laterality Date    CYSTOSCOPY,INSERT URETERAL STENT      LIPOMA REMOVAL  04/27/2023      Social History     Socioeconomic History    Marital status:    Tobacco Use    Smoking status: Former     Types: Cigars    Smokeless tobacco: Never    Tobacco comments:     Occas Cigar   Vaping Use    Vaping Use: Never used   Substance and Sexual Activity    Alcohol use: Yes     Alcohol/week: 1.0 standard drink of alcohol     Types: 1 Glasses of wine per week     Comment: Once evry other week    Drug use: Never         OBJECTIVE:   /82 (BP Location: Left arm, Patient Position: Sitting, Cuff Size: adult)   Pulse 75   Resp 16   Wt 208 lb (94.3 kg)   SpO2 99%   BMI 30.72 kg/m²   Constitutional: Oriented to person, place, and time. No distress.   HEENT:  Normocephalic and atraumatic.  Cardiovascular: Normal rate, regular rhythm and intact distal pulses.  No murmur, rubs or gallops.   Pulmonary/Chest: Effort normal and breath sounds normal. No respiratory  distress.  Abdominal: Soft and nondistended.  Tenderness of left lower quadrant without rebound or guarding.  Musculoskeletal: No edema.  Left flank tenderness.  Skin: Skin is warm and dry. No rash.  Psychiatric: Normal mood and affect.     ASSESSMENT AND PLAN:   Heidy Barry is a 48 year old male who presents as a follow-up.      Acute left flank pain:  Superficial tenderness of left flank with mild tenderness of the left lower abdomen suggestive more of a muscular etiology.  Symptoms are less likely secondary to recurrent nephrolithiasis: Urinalysis ordered to rule this out.  Examination does not support an intra-abdominal/intestinal etiology, such as diverticulitis, however CT of the abdomen will be considered pending symptom progression with holding atorvastatin.  In light of history of pure hypercholesterolemia and intolerance to atorvastatin therapy, will consider alternate hydrophilic statin therapy, such as rosuvastatin at 10 mg daily.    Total time spent: 30 minutes    The patient indicates understanding of these issues and agrees to the plan.    TODAY'S ORDERS     Orders Placed This Encounter   Procedures    Urine Dip, auto without Micro       Meds & Refills:  Requested Prescriptions      No prescriptions requested or ordered in this encounter       Imaging & Consults:  None    No follow-ups on file.  There are no Patient Instructions on file for this visit.    All questions were answered and the patient agrees with the plan.     Thank you,  Jay Jay Guardado MD

## 2024-03-12 ENCOUNTER — HOSPITAL ENCOUNTER (OUTPATIENT)
Age: 49
Discharge: HOME | End: 2024-03-12
Payer: COMMERCIAL

## 2024-03-12 VITALS
DIASTOLIC BLOOD PRESSURE: 85 MMHG | HEART RATE: 76 BPM | BODY MASS INDEX: 28.88 KG/M2 | SYSTOLIC BLOOD PRESSURE: 141 MMHG | HEIGHT: 69 IN | TEMPERATURE: 98 F | OXYGEN SATURATION: 98 % | WEIGHT: 195 LBS | RESPIRATION RATE: 20 BRPM

## 2024-03-12 DIAGNOSIS — L28.2 PRURITIC RASH: Primary | ICD-10-CM

## 2024-03-12 DIAGNOSIS — L30.9 DERMATITIS: ICD-10-CM

## 2024-03-12 PROCEDURE — 99213 OFFICE O/P EST LOW 20 MIN: CPT

## 2024-03-13 NOTE — DISCHARGE INSTRUCTIONS
Start taking Zyrtec or Claritin or other antihistamine daily (not Benadryl) for 1-week  Start taking Pepcid 20mg daily for 1-week  Apply your Triamcinolone twice daily for 1-week  Drink plenty of water  Avoid scratching as this can lead to a skin infection  Continue with skin moisture routine

## 2024-03-13 NOTE — ED PROVIDER NOTES
History     Chief Complaint   Patient presents with    Allergies     Entered by patient       Subjective:   KAYA Moody Alex Barry, 48 year old male with notable medical history of fatty liver who presents with pruritic rash. Patient reports pruritic rash starting approx 1-week ago w/o known exposures. He has been taking Benadryl w/ temporary relief. Denies sore throat, difficulty breathing / swallowing, abdominal pain, n/v. Patient does have dry skin and dose use lotion at home.        Objective:   No pertinent past medical history.            No pertinent past surgical history.              No pertinent social history.            No current facility-administered medications on file prior to encounter.     Current Outpatient Medications on File Prior to Encounter   Medication Sig Dispense Refill    clobetasol 0.05 % External Ointment Apply topically 2 (two) times daily.      atorvastatin 20 MG Oral Tab Take 1 tablet (20 mg total) by mouth nightly. 90 tablet 1    docusate sodium (COLACE) 100 MG Oral Cap 1 capsule (100 mg total) daily.      clotrimazole-betamethasone 1-0.05 % External Cream Apply 1 Application  topically 2 (two) times daily as needed. 2 each 3    triamcinolone 0.1 % External Cream Apply topically 2 (two) times daily as needed. 60 g 3    Omeprazole 20 MG Oral Tab EC Take one tablet (20mg) daily for 8 weeks. (Patient taking differently: daily.) 90 tablet 0    Ergocalciferol (VITAMIN D OR) Take by mouth daily.      Tadalafil (CIALIS) 20 MG Oral Tab Take 1 tablet (20 mg total) by mouth as needed for Erectile Dysfunction. (Patient not taking: Reported on 1/15/2024) 24 tablet 1    Fexofenadine-Pseudoephedrine (ALLEGRA-D OR) Take by mouth daily as needed.      Multiple Vitamin (ONE-DAILY MULTI VITAMINS) Oral Tab Take 1 tablet by mouth daily.           Review of Systems   Skin:  Positive for rash.   All other systems reviewed and are negative.        Constitutional and vital signs reviewed.       All other systems reviewed and negative except as noted above.    I have reviewed the family history, social history, allergies, and outpatient medications.     History reviewed from EMR: Encounters, problem list, allergies, medications      Physical Exam     ED Triage Vitals [03/12/24 1907]   /85   Pulse 76   Resp 20   Temp 98.4 °F (36.9 °C)   Temp src Temporal   SpO2 98 %   O2 Device None (Room air)       Current:/85   Pulse 76   Temp 98.4 °F (36.9 °C) (Temporal)   Resp 20   Ht 175.3 cm (5' 9\")   Wt 88.5 kg   SpO2 98%   BMI 28.80 kg/m²       Physical Exam  Vitals and nursing note reviewed.   Constitutional:       General: He is not in acute distress.     Appearance: Normal appearance. He is normal weight. He is not ill-appearing or toxic-appearing.   HENT:      Head: Normocephalic and atraumatic.      Right Ear: External ear normal.      Left Ear: External ear normal.      Nose: Nose normal. No congestion or rhinorrhea.      Mouth/Throat:      Mouth: Mucous membranes are moist.   Eyes:      Extraocular Movements: Extraocular movements intact.      Conjunctiva/sclera: Conjunctivae normal.      Pupils: Pupils are equal, round, and reactive to light.   Cardiovascular:      Rate and Rhythm: Normal rate.      Pulses: Normal pulses.   Pulmonary:      Effort: Pulmonary effort is normal. No respiratory distress.   Musculoskeletal:         General: No swelling, tenderness or signs of injury. Normal range of motion.      Cervical back: Normal range of motion.   Skin:     General: Skin is warm and dry.      Capillary Refill: Capillary refill takes less than 2 seconds.      Comments: Few, scattered, single hives. Few areas of excoriations r/t scratching. No signs of cellulitis   Neurological:      General: No focal deficit present.      Mental Status: He is alert and oriented to person, place, and time. Mental status is at baseline.   Psychiatric:         Mood and Affect: Mood normal.         Behavior:  Behavior normal.         Thought Content: Thought content normal.         Judgment: Judgment normal.            ED Course     Labs Reviewed - No data to display  No orders to display       Vitals:    03/12/24 1907   BP: 141/85   Pulse: 76   Resp: 20   Temp: 98.4 °F (36.9 °C)   TempSrc: Temporal   SpO2: 98%   Weight: 88.5 kg   Height: 175.3 cm (5' 9\")            [facility]        Heidy Barry, 48 year old male with medical history as noted above who presents with pruritic rash   - patient in NAD, VSS   - contact / irritant dermatitis vs allergic reaction vs viral vs other   - No signs of severe reaction   - Discussed oral steroid today and home care with antihistamine, pepcid, and topical steroid (patient has Triamcinolone).   - Close f/u with primary care provider         ** See ED course below for additional information on care provided / interventions / notable events throughout patient's encounter.         ** I have independently reviewed the radiology images, clinical lab results, and ECG tracings as described above (if applicable)    ** See below for home care instructions (if applicable)          Medical Decision Making  Risk  OTC drugs.        Disposition and Plan     Clinical Impression:  1. Pruritic rash    2. Dermatitis         Disposition:  Discharge  3/12/2024  7:36 pm    Follow-up:  Rafael Sanders MD  [address] [address]  [phone]      As needed          Medications Prescribed:  Discharge Medication List as of 3/12/2024  7:43 PM          The above patient (and/or guardian) was made aware that an appropriate evaluation has been performed, and that no additional testing is required at this time. In my medical judgment, there is currently no evidence of an immediate life-threatening or surgical condition, therefore discharge is indicated at this time. The patient (and/or guardian) was advised that a small risk still exists that a serious condition could develop. The  patient was instructed to arrange close follow-up with their primary care provider (or the referral provider given today). The patient received written and verbal instructions regarding their condition / concerns, demonstrated understanding, and is agreement with the outpatient treatment plan.        Home care instructions:    Start taking Zyrtec or Claritin or other antihistamine daily (not Benadryl) for 1-week  Start taking Pepcid 20mg daily for 1-week  Apply your Triamcinolone twice daily for 1-week  Drink plenty of water  Avoid scratching as this can lead to a skin infection  Continue with skin moisture routine      James Plunkett, DNP, APRN, AGACNP-BC, FNP-C, CNL  Adult-Gerontology Acute Care & Family Nurse Practitioner  [facility]

## 2024-04-09 ENCOUNTER — OFFICE VISIT (OUTPATIENT)
Dept: INTERNAL MEDICINE CLINIC | Facility: CLINIC | Age: 49
End: 2024-04-09
Payer: COMMERCIAL

## 2024-04-09 VITALS
HEIGHT: 69 IN | OXYGEN SATURATION: 96 % | DIASTOLIC BLOOD PRESSURE: 82 MMHG | SYSTOLIC BLOOD PRESSURE: 138 MMHG | BODY MASS INDEX: 29.62 KG/M2 | HEART RATE: 86 BPM | RESPIRATION RATE: 16 BRPM | TEMPERATURE: 97 F | WEIGHT: 200 LBS

## 2024-04-09 DIAGNOSIS — J01.10 ACUTE NON-RECURRENT FRONTAL SINUSITIS: ICD-10-CM

## 2024-04-09 DIAGNOSIS — R05.1 ACUTE COUGH: Primary | ICD-10-CM

## 2024-04-09 DIAGNOSIS — R03.0 ELEVATED BP WITHOUT DIAGNOSIS OF HYPERTENSION: ICD-10-CM

## 2024-04-09 PROCEDURE — 3075F SYST BP GE 130 - 139MM HG: CPT | Performed by: NURSE PRACTITIONER

## 2024-04-09 PROCEDURE — 99213 OFFICE O/P EST LOW 20 MIN: CPT | Performed by: NURSE PRACTITIONER

## 2024-04-09 PROCEDURE — 3079F DIAST BP 80-89 MM HG: CPT | Performed by: NURSE PRACTITIONER

## 2024-04-09 PROCEDURE — 3008F BODY MASS INDEX DOCD: CPT | Performed by: NURSE PRACTITIONER

## 2024-04-09 RX ORDER — CODEINE PHOSPHATE AND GUAIFENESIN 10; 100 MG/5ML; MG/5ML
10 SOLUTION ORAL NIGHTLY PRN
Qty: 120 ML | Refills: 0 | Status: SHIPPED | OUTPATIENT
Start: 2024-04-09

## 2024-04-09 RX ORDER — DOXYCYCLINE HYCLATE 100 MG/1
100 CAPSULE ORAL 2 TIMES DAILY
Qty: 20 CAPSULE | Refills: 0 | Status: SHIPPED | OUTPATIENT
Start: 2024-04-09

## 2024-04-09 NOTE — PROGRESS NOTES
Heidy Barry is a 48 year old male.    Chief Complaint   Patient presents with    Cough    Throat Problem    Other     Headache, sinus pressure , green-yellowish mucus, problem swallowing     Sinus Problem       HPI:   here for evaluation of cough.  4 day duration.  Increased sinus congestion and ear fullness.   No fever or chills.   He has not tested for covid.  Yellow green sputum when coughing.   No SOB.  No sore throat.   He did travel recently.  He returned from [place] on Friday.    Using mucinex DM.  Helping some.     Follows with Dr Teixeira for GI issues.     Patient Active Problem List   Diagnosis    Dyslipidemia    Nephrolithiasis    Fatty liver    Gastroesophageal reflux disease    Abdominal pain, right upper quadrant    Erosive esophagitis    Special screening for malignant neoplasm of colon    Benign neoplasm of sigmoid colon    Benign neoplasm of rectum    Internal hemorrhoids    Colon polyp    History of nephrolithiasis     Current Outpatient Medications   Medication Sig Dispense Refill    doxycycline 100 MG Oral Cap Take 1 capsule (100 mg total) by mouth 2 (two) times daily. 20 capsule 0    guaiFENesin-codeine (CHERATUSSIN AC) 100-10 MG/5ML Oral Solution Take 10 mL by mouth nightly as needed for cough. 120 mL 0    clobetasol 0.05 % External Ointment Apply topically 2 (two) times daily.      atorvastatin 20 MG Oral Tab Take 1 tablet (20 mg total) by mouth nightly. 90 tablet 1    docusate sodium (COLACE) 100 MG Oral Cap 1 capsule (100 mg total) daily.      clotrimazole-betamethasone 1-0.05 % External Cream Apply 1 Application  topically 2 (two) times daily as needed. 2 each 3    triamcinolone 0.1 % External Cream Apply topically 2 (two) times daily as needed. 60 g 3    Omeprazole 20 MG Oral Tab EC Take one tablet (20mg) daily for 8 weeks. (Patient taking differently: daily.) 90 tablet 0    Ergocalciferol (VITAMIN D OR) Take by mouth daily.      Tadalafil (CIALIS) 20 MG Oral Tab Take 1 tablet  (20 mg total) by mouth as needed for Erectile Dysfunction. 24 tablet 1    Fexofenadine-Pseudoephedrine (ALLEGRA-D OR) Take by mouth daily as needed.      Multiple Vitamin (ONE-DAILY MULTI VITAMINS) Oral Tab Take 1 tablet by mouth daily.        Past Medical History:   Diagnosis Date    Abdominal pain     Over 2 years occasionally    Blood in the stool     Calculus of kidney 08/2022    Constipation     Esophageal reflux     Flatulence/gas pain/belching     Heartburn     Been having it for many years    Hemorrhoids     High cholesterol     Pain with bowel movements     Visual impairment     glasses distance/computer    Wears glasses       Social History:  Social History     Socioeconomic History    Marital status:    Tobacco Use    Smoking status: Former     Types: Cigars    Smokeless tobacco: Never    Tobacco comments:     Occas Cigar   Vaping Use    Vaping Use: Never used   Substance and Sexual Activity    Alcohol use: Yes     Alcohol/week: 1.0 standard drink of alcohol     Types: 1 Glasses of wine per week     Comment: Once evry other week    Drug use: Never   Other Topics Concern    Caffeine Concern Yes    Exercise Yes    Seat Belt Yes    Special Diet No    Stress Concern No    Weight Concern No     Service No    Blood Transfusions No    Occupational Exposure No    Hobby Hazards No    Sleep Concern No    Back Care No    Bike Helmet No    Self-Exams No     Family History   Problem Relation Age of Onset    Dementia Father     Glaucoma Father     Other (Other) Father         prostate disease    Diabetes Mother     Lipids Mother     Stroke Maternal Grandmother     Other (Other) Maternal Grandmother         prostate issues    Heart Disorder Paternal Grandfather         heart attack        Allergies  Allergies   Allergen Reactions    Penicillins RASH    Seasonal Coughing       REVIEW OF SYSTEMS:   GENERAL HEALTH: feels ill  RESPIRATORY: denies shortness of breath with exertion, cough  CARDIOVASCULAR:  denies chest pain on exertion, no palpatations  GI: denies abdominal pain and denies heartburn, no diarrhea or constipation  MUSCULOSKELETAL:  No arthralgias or myalgias  NEURO: frontal headaches,     EXAM:   /82   Pulse 86   Temp 97 °F (36.1 °C) (Temporal)   Resp 16   Ht 5' 9\" (1.753 m)   Wt 200 lb (90.7 kg)   SpO2 96%   BMI 29.53 kg/m²   GENERAL: well developed, well nourished,in no apparent distress  HEENT: atraumatic, normocephalic,ears with bilateral fluid filled without erythema.   throat clear  frontal sinus tenderness.   NECK: supple,no adenopathy,  LUNGS: normal rate without respiratory distress, lungs clear to auscultation  no wheezing.   CARDIO: RRR without murmur  PSYCH: alert and oriented x 3    ASSESSMENT AND PLAN:     Encounter Diagnoses   Name Primary?    Acute cough  discussed cough suppresion.  He has tessalon at home.   Can take up to tid and cheratussin ac hs only. Warned pt about sedation with this medication and advised pt about necessary precautions and activities to avoid. Yes    Acute non-recurrent frontal sinusitis he will test for covid upon returning home.  If positive, monitor symptoms.  If negative, can start doxy.       Elevated BP without diagnosis of hypertension  repeat better w ill monitor.  Aovid otc decongestants.         No orders of the defined types were placed in this encounter.      Meds & Refills for this Visit:  Requested Prescriptions     Signed Prescriptions Disp Refills    doxycycline 100 MG Oral Cap 20 capsule 0     Sig: Take 1 capsule (100 mg total) by mouth 2 (two) times daily.    guaiFENesin-codeine (CHERATUSSIN AC) 100-10 MG/5ML Oral Solution 120 mL 0     Sig: Take 10 mL by mouth nightly as needed for cough.       Imaging & Consults:  None    No follow-ups on file.  There are no Patient Instructions on file for this visit.

## 2024-04-28 DIAGNOSIS — E78.5 DYSLIPIDEMIA: ICD-10-CM

## 2024-04-29 RX ORDER — ATORVASTATIN CALCIUM 20 MG/1
20 TABLET, FILM COATED ORAL NIGHTLY
Qty: 90 TABLET | Refills: 0 | Status: SHIPPED | OUTPATIENT
Start: 2024-04-29

## 2024-04-30 RX ORDER — TADALAFIL 20 MG/1
20 TABLET ORAL AS NEEDED
Qty: 24 TABLET | Refills: 1 | Status: SHIPPED | OUTPATIENT
Start: 2024-04-30

## 2024-04-30 NOTE — TELEPHONE ENCOUNTER
Last OV? 04/09/2024    Last CPE? 11/20/2023    Last Refill?  Medication Quantity Refills Start End   Tadalafil (CIALIS) 20 MG Oral Tab 24 tablet 1 2/23/2022 --   Sig:   Take 1 tablet (20 mg total) by mouth as needed for Erectile Dysfunction.       Last Labs?  Cbc,CMP,Lipid,TSH,HGA1C, and PSA- 11/16/2023    No future appointments.      Please Approve or Deny

## 2024-06-21 ENCOUNTER — HOSPITAL ENCOUNTER (OUTPATIENT)
Age: 49
Discharge: HOME | End: 2024-06-21

## 2024-06-21 ENCOUNTER — NURSE TRIAGE (OUTPATIENT)
Dept: INTERNAL MEDICINE CLINIC | Facility: CLINIC | Age: 49
End: 2024-06-21

## 2024-06-21 VITALS
HEIGHT: 69 IN | DIASTOLIC BLOOD PRESSURE: 88 MMHG | BODY MASS INDEX: 29.62 KG/M2 | RESPIRATION RATE: 18 BRPM | HEART RATE: 92 BPM | WEIGHT: 200 LBS | SYSTOLIC BLOOD PRESSURE: 145 MMHG | OXYGEN SATURATION: 100 % | TEMPERATURE: 99 F

## 2024-06-21 DIAGNOSIS — U07.1 COVID-19: Primary | ICD-10-CM

## 2024-06-21 LAB
POCT INFLUENZA A: NEGATIVE
POCT INFLUENZA B: NEGATIVE
S PYO AG THROAT QL IA.RAPID: NEGATIVE
SARS-COV-2 RNA RESP QL NAA+PROBE: DETECTED

## 2024-06-21 PROCEDURE — 87502 INFLUENZA DNA AMP PROBE: CPT | Performed by: NURSE PRACTITIONER

## 2024-06-21 PROCEDURE — 99213 OFFICE O/P EST LOW 20 MIN: CPT

## 2024-06-21 PROCEDURE — 87651 STREP A DNA AMP PROBE: CPT | Performed by: NURSE PRACTITIONER

## 2024-06-21 PROCEDURE — 99214 OFFICE O/P EST MOD 30 MIN: CPT

## 2024-06-21 RX ORDER — NIRMATRELVIR AND RITONAVIR 300-100 MG
KIT ORAL
Qty: 30 TABLET | Refills: 0 | Status: SHIPPED | OUTPATIENT
Start: 2024-06-21 | End: 2024-06-26

## 2024-06-21 NOTE — DISCHARGE INSTRUCTIONS
Follow-up with your primary for all of your healthcare needs  Stop taking your cholesterol medicine and your Cialis  if you decide to take the Paxlovid take the Paxlovid as directed by the pharmacy  Increase fluids keep hydrated vitamin C will be helpful continue with Tylenol Motrin for bodyaches and chills melatonin to help with sleep  Return to the emergency room for symptoms or concerns

## 2024-06-21 NOTE — ED PROVIDER NOTES
Patient Seen in: [facility]      History     Chief Complaint   Patient presents with    Cold     Entered by patient    Cough/URI    Sore Throat     Stated Complaint: Cold x 1 day    Subjective:   HPI    49-year-old male presents to the immediate care with complaints of cough chills body aches congestion since yesterday.  Cannot recall sick contact.  Denies any shortness breath or interval breathing.  Patient has no other issues with concerns.  The patient's medication list, past medical history and social history elements as listed in today's nurse's notes were reviewed and agreed (except as otherwise stated in the HPI).  The patient's family history reviewed and determined to be noncontributory to the presenting problem.      Objective:   Past Medical History:    Abdominal pain    Over 2 years occasionally    Blood in the stool    Calculus of kidney    Constipation    Esophageal reflux    Flatulence/gas pain/belching    Heartburn    Been having it for many years    Hemorrhoids    High cholesterol    Pain with bowel movements    Visual impairment    glasses distance/computer    Wears glasses              Past Surgical History:   Procedure Laterality Date    Cystoscopy,insert ureteral stent      Lipoma removal  04/27/2023                Social History     Socioeconomic History    Marital status:    Tobacco Use    Smoking status: Former     Types: Cigars    Smokeless tobacco: Never    Tobacco comments:     Occas Cigar   Vaping Use    Vaping status: Never Used   Substance and Sexual Activity    Alcohol use: Yes     Alcohol/week: 1.0 standard drink of alcohol     Types: 1 Glasses of wine per week     Comment: Occasionally    Drug use: Never   Other Topics Concern    Caffeine Concern Yes    Exercise Yes    Seat Belt Yes    Special Diet No    Stress Concern No    Weight Concern No     Service No    Blood Transfusions No    Occupational Exposure No    Hobby Hazards No    Sleep Concern No     Back Care No    Bike Helmet No    Self-Exams No              Review of Systems    Positive for stated complaint: Cold x 1 day  Other systems are as noted in HPI.  Constitutional and vital signs reviewed.      All other systems reviewed and negative except as noted above.    Physical Exam     ED Triage Vitals [06/21/24 1140]   /88   Pulse 92   Resp 18   Temp 98.7 °F (37.1 °C)   Temp src Temporal   SpO2 100 %   O2 Device None (Room air)       Current Vitals:   Vital Signs  BP: 145/88  Pulse: 92  Resp: 18  Temp: 98.7 °F (37.1 °C)  Temp src: Temporal    Oxygen Therapy  SpO2: 100 %  O2 Device: None (Room air)            Physical Exam    GENERAL: The patient is well-developed well-nourished nontoxic, non-ill-appearing  HEENT: Normocephalic.  Atraumatic.  Extraocular motions are intact  NECK: Supple.  No meningitic signs are noted.   CHEST/LUNGS: Clear to auscultation.  There is no respiratory distress noted.  HEART/CARDIOVASCULAR: Regular.  There is no tachycardia.   SKIN: There is no rash.  NEURO: The patient is awake, alert, and oriented.  The patient is cooperative.  ED Course     Labs Reviewed   RAPID SARS-COV-2 BY PCR - Abnormal; Notable for the following components:       Result Value    Rapid SARS-CoV-2 by PCR Detected (*)     All other components within normal limits   RAPID STREP A - Normal   POCT FLU TEST - Normal    Narrative:     This assay is a rapid molecular in vitro test utilizing nucleic acid amplification of influenza A and B viral RNA.                 MDM   Pertinent Labs & Imaging studies reviewed. (See chart for details)  Differential diagnosis considered but not limited to: COVID, strep, flu, viral syndrome  Patient coming in with bodyaches chills congestion. Patient provided with pain medication. Labs reviewed negative strep negative influenza positive COVID.  Will treat for possible COVID. Will discharge on Paxlovid patient was advised to stop taking his Cialis and cholesterol medicine if he  starts the Paxlovid. Patient is comfortable with this plan.  Overall Pt looks good. Non-toxic, well-hydrated and in no respiratory distress. Vital signs are reassuring. Exam is reassuring. I do not believe pt  requires and additional  diagnostic studiesor intervention. I believe pt  can be discharged home to continue evaluation as an outpatient. Follow-up provider given. Discharge instructions given and reviewed. Return for any problems. All understand and agreewith the plan.    Please note that this report has been produced using speech recognition software and may contain errors related to that system including, but not limited to, errors in grammar, punctuation, and spelling, as well as words and phrases that possibly may have been recognized inappropriately.  If there are any questions or concerns, contact the dictating provider for clarification.    Note to patient: The 21st Century Cures Act makes medical notes like these available to patients in the interest of transparency. However, this is a medical document intended as peer to peer communication. It is written in medical language and may contain abbreviations or verbiage that are unfamiliar. It may appear blunt or direct. Medical documents are intended to carry relevant information, facts as evident, and the clinical opinion of the practitioner.                                    Medical Decision Making      Disposition and Plan     Clinical Impression:  1. COVID-19         Disposition:  Discharge  6/21/2024 12:18 pm    Follow-up:  Rafael Sanders MD  [address] [place] [id number]  [phone]                Medications Prescribed:  Current Discharge Medication List        START taking these medications    Details   nirmatrelvir-ritonavir (PAXLOVID, 300/100,) 300-100 MG Oral Tablet Therapy Pack Take two nirmatrelvir tablets (300mg) with one ritonavir tablet (100mg) together twice daily for 5 days.  Qty: 30 tablet, Refills: 0

## 2024-06-21 NOTE — TELEPHONE ENCOUNTER
Action Requested: Summary for Provider     []  Critical Lab, Recommendations Needed  [] Need Additional Advice  []   FYI    []   Need Orders  [] Need Medications Sent to Pharmacy  []  Other     SUMMARY: Patient reports a positive home Covid test today. He is requesting Paxlovid.  Two days ago he developed fatigue, body aches, sore throat, mild GI upset, nasal congestion.  He denies loss of taste or smell, fever or cough.  Advised patient of home remedies for mild Covid symptoms, including ibuprofen for sore throat and body aches, push fluids, rest.  If worsening symptoms over weekend, proceed to urgent care.      Reason for call: Covid  Onset: 2 days    Reason for Disposition   COVID-19 diagnosed by positive lab test (e.g., PCR, rapid self-test kit) and mild symptoms (e.g., cough, fever, others) and no complications or SOB    Protocols used: Coronavirus (COVID-19) Diagnosed or [id number]

## 2024-06-21 NOTE — ED INITIAL ASSESSMENT (HPI)
Pt. States his throat is sore, body aches, chills, dry cough, congestion. Symptoms started yesterday.

## 2024-09-14 ENCOUNTER — PATIENT MESSAGE (OUTPATIENT)
Dept: INTERNAL MEDICINE CLINIC | Facility: CLINIC | Age: 49
End: 2024-09-14

## 2024-09-14 DIAGNOSIS — E78.5 DYSLIPIDEMIA: ICD-10-CM

## 2024-09-14 DIAGNOSIS — Z13.29 SCREENING FOR THYROID DISORDER: ICD-10-CM

## 2024-09-14 DIAGNOSIS — Z13.220 SCREENING FOR LIPID DISORDERS: ICD-10-CM

## 2024-09-14 DIAGNOSIS — Z13.0 SCREENING FOR DISORDER OF BLOOD AND BLOOD-FORMING ORGANS: ICD-10-CM

## 2024-09-14 DIAGNOSIS — Z00.00 ROUTINE GENERAL MEDICAL EXAMINATION AT A HEALTH CARE FACILITY: Primary | ICD-10-CM

## 2024-09-14 DIAGNOSIS — Z13.228 SCREENING FOR METABOLIC DISORDER: ICD-10-CM

## 2024-09-17 NOTE — TELEPHONE ENCOUNTER
From: Heidy Barry  To: Rafael Sanders  Sent: 9/14/2024 1:07 AM CDT  Subject: Blood work for Annual Physical     I have my physical scheduled for 10/31 and would like to get the blood work done in the next few weeks. Please send the prescription for the blood work. I would like to check the A1C as well.

## 2024-09-18 RX ORDER — ATORVASTATIN CALCIUM 20 MG/1
20 TABLET, FILM COATED ORAL NIGHTLY
Qty: 90 TABLET | Refills: 3 | Status: SHIPPED | OUTPATIENT
Start: 2024-09-18

## 2024-09-18 NOTE — TELEPHONE ENCOUNTER
Refill passed per [facility] protocol.  Requested Prescriptions   Pending Prescriptions Disp Refills    atorvastatin 20 MG Oral Tab 90 tablet 0     Sig: Take 1 tablet (20 mg total) by mouth nightly.       Cholesterol Medication Protocol Passed - 9/14/2024  1:02 AM        Passed - ALT < 80     Lab Results   Component Value Date    ALT 30 11/16/2023             Passed - ALT resulted within past year        Passed - Lipid panel within past 12 months     Lab Results   Component Value Date    CHOLEST 219 (H) 11/16/2023    TRIG 187 (H) 11/16/2023    HDL 46 11/16/2023     (H) 11/16/2023    TCHDLRATIO 4.8 11/16/2023    NONHDLC 173 (H) 11/16/2023             Passed - In person appointment or virtual visit in the past 12 mos or appointment in next 3 mos     Recent Outpatient Visits              5 months ago [address] cough    [address]    Office Visit    8 months ago Acute left flank pain    [address] [place] Guardado MD    Office Visit    10 months ago RUQ pain    [facility],  [facility] Sean Teixeira MD    Office Visit    10 months ago Wellness examination    [address] Rafael Sanders MD    Office Visit    1 year ago Encounter for screening for malignant neoplasm of skin    [facility] Yordan Whitaker MD    Office Visit          Future Appointments         Provider Department Appt Notes    In 1 month Rafael Sanders MD [address] cpe-last 11/20/23-too soon-pt has new insurance                       Recent Outpatient Visits              5 months ago Acute cough    [address] Farnaz Aguero APRN    Office Visit    8 months ago Acute left flank pain    [address] Jay Jay Guardado MD    Office Visit    10 months ago  RUQ pain    [facility],  [facility] Sean Teixeira MD    Office Visit    10 months ago Wellness examination    [facility], [address] [place] [address] Rafael Sanders MD    Office Visit    1 year ago Encounter for screening for malignant neoplasm of skin    [facility] Yordan Mensah MD    Office Visit          Future Appointments         Provider Department Appt Notes    In 1 month Rafael Sanders MD [facility], [address] [place] [address] cpe-last 11/20/23-too soon-pt has new insurance

## 2024-09-27 ENCOUNTER — NURSE TRIAGE (OUTPATIENT)
Dept: FAMILY MEDICINE CLINIC | Facility: CLINIC | Age: 49
End: 2024-09-27

## 2024-09-27 NOTE — TELEPHONE ENCOUNTER
Action Requested: Summary for Provider     []  Critical Lab, Recommendations Needed  [] Need Additional Advice  [x]   FYI    []   Need Orders  [] Need Medications Sent to Pharmacy  []  Other     SUMMARY: Pt scheduled for evaluation for reported abdominal pain.  Offered pt OV per protocol timeline, pt reports he will be out of town.  Schedule for soonest appt upon his return, discussed UC precautions for worsening of symptoms while away, pt verbalized understanding.    Reason for call: Abdominal Pain  Onset: Few weeks    Notes:  - Hernia-like pain, not taking pain medications.  - Worse when standing, OK when sitting.  - Hx of hernia in the past, pain is in similar area but feels different than past hernia pain. Denies feeling lump/protrusion to site.  Denies discoloration of skin.  - Denies fever, HA, recent illnesses.  - Bladder/bowels at baseline, denies vomiting/recent illness.  - Pain comes and goes, relieved with change of positions/holding light pressure.    Reason for Disposition   Abdominal pain is a chronic symptom (recurrent or ongoing AND lasting > 4 weeks)    Answer Assessment - Initial Assessment Questions  1. LOCATION: \"Where does it hurt?\"       L side of abdomen.  2. RADIATION: \"Does the pain shoot anywhere else?\" (e.g., chest, back)      Denies  3. ONSET: \"When did the pain begin?\" (Minutes, hours or days ago)       Few weeks ago  4. SUDDEN: \"Gradual or sudden onset?\"      Sudden  5. PATTERN \"Does the pain come and go, or is it constant?\"     - If it comes and goes: \"How long does it last?\" \"Do you have pain now?\"      (Note: Comes and goes means the pain is intermittent. It goes away completely between bouts.)     - If constant: \"Is it getting better, staying the same, or getting worse?\"       (Note: Constant means the pain never goes away completely; most serious pain is constant and gets worse.)       Come and go  6. SEVERITY: \"How bad is the pain?\"  (e.g., Scale 1-10; mild, moderate, or severe)      - MILD (1-3): Doesn't interfere with normal activities, abdomen soft and not tender to touch.      - MODERATE (4-7): Interferes with normal activities or awakens from sleep, abdomen tender to touch.      - SEVERE (8-10): Excruciating pain, doubled over, unable to do any normal activities.        7/10  7. RECURRENT SYMPTOM: \"Have you ever had this type of stomach pain before?\" If Yes, ask: \"When was the last time?\" and \"What happened that time?\"       Yes, hx of hernia in the past  8. CAUSE: \"What do you think is causing the stomach pain?\"      Unknown  9. RELIEVING/AGGRAVATING FACTORS: \"What makes it better or worse?\" (e.g., antacids, bending or twisting motion, bowel movement)      Changing positions, holding light pressure.  10. OTHER SYMPTOMS: \"Do you have any other symptoms?\" (e.g., back pain, diarrhea, fever, urination pain, vomiting)        Denies    Protocols used: Abdominal Pain - Male-A-OH

## 2024-10-04 ENCOUNTER — OFFICE VISIT (OUTPATIENT)
Dept: INTERNAL MEDICINE CLINIC | Facility: CLINIC | Age: 49
End: 2024-10-04
Payer: COMMERCIAL

## 2024-10-04 VITALS
TEMPERATURE: 97 F | HEIGHT: 69.29 IN | DIASTOLIC BLOOD PRESSURE: 86 MMHG | SYSTOLIC BLOOD PRESSURE: 126 MMHG | HEART RATE: 68 BPM | WEIGHT: 204 LBS | OXYGEN SATURATION: 99 % | RESPIRATION RATE: 18 BRPM | BODY MASS INDEX: 29.87 KG/M2

## 2024-10-04 DIAGNOSIS — K46.9 HERNIA: ICD-10-CM

## 2024-10-04 DIAGNOSIS — R10.32 LEFT LOWER QUADRANT ABDOMINAL PAIN: Primary | ICD-10-CM

## 2024-10-04 LAB
BILIRUBIN: NEGATIVE
GLUCOSE (URINE DIPSTICK): NEGATIVE MG/DL
KETONES (URINE DIPSTICK): NEGATIVE MG/DL
LEUKOCYTES: NEGATIVE
MULTISTIX LOT#: ABNORMAL NUMERIC
NITRITE, URINE: NEGATIVE
OCCULT BLOOD: NEGATIVE
PH, URINE: 6 (ref 4.5–8)
PROTEIN (URINE DIPSTICK): NEGATIVE MG/DL
SPECIFIC GRAVITY: 1.01 (ref 1–1.03)
URINE-COLOR: YELLOW
UROBILINOGEN,SEMI-QN: 2 MG/DL (ref 0–1.9)

## 2024-10-04 RX ORDER — AZITHROMYCIN 250 MG/1
TABLET, FILM COATED ORAL
Qty: 6 TABLET | Refills: 0 | Status: SHIPPED | OUTPATIENT
Start: 2024-10-04 | End: 2024-10-08

## 2024-10-04 NOTE — PROGRESS NOTES
Heidy Barry is a 49 year old male.   Chief Complaint   Patient presents with    Abdominal Pain     ES rm - 3 - INTMT dull increasing LLQ pain at a 3/10 for 3 wks.  No diarrhea, constipation or bleeding.  No nausea, vomiting or fever.     HPI:    Patient here today for LLQ pain intermittently over last few weeks. Patient with history of periumbilical hernia present on imaging 2022. Patient has been exercising using elliptical and rower.  No other changes to lifting, pushing, pulling. No recent injury or trauma. Patient states symptoms are not noticeable with sitting. Symptoms can occur when standing or with activity. Patient denies fever, body aches, chills, changes to bowel movements, or urination. Patient denies symptoms at this time.     Patient has upcoming international travel which he travels often for work- has taken zpak on trip previously and would like to do so again.     Allergies:  Allergies   Allergen Reactions    Penicillins RASH    Seasonal Coughing      Current Meds:  Current Outpatient Medications   Medication Sig Dispense Refill    atorvastatin 20 MG Oral Tab Take 1 tablet (20 mg total) by mouth nightly. 90 tablet 3    Tadalafil (CIALIS) 20 MG Oral Tab Take 1 tablet (20 mg total) by mouth as needed for Erectile Dysfunction. 24 tablet 1    doxycycline 100 MG Oral Cap Take 1 capsule (100 mg total) by mouth 2 (two) times daily. 20 capsule 0    clobetasol 0.05 % External Ointment Apply topically 2 (two) times daily.      docusate sodium (COLACE) 100 MG Oral Cap 1 capsule (100 mg total) daily.      clotrimazole-betamethasone 1-0.05 % External Cream Apply 1 Application  topically 2 (two) times daily as needed. 2 each 3    triamcinolone 0.1 % External Cream Apply topically 2 (two) times daily as needed. 60 g 3    Omeprazole 20 MG Oral Tab EC Take one tablet (20mg) daily for 8 weeks. (Patient taking differently: daily.) 90 tablet 0    Ergocalciferol (VITAMIN D OR) Take by mouth daily.       Fexofenadine-Pseudoephedrine (ALLEGRA-D OR) Take by mouth daily as needed.      Multiple Vitamin (ONE-DAILY MULTI VITAMINS) Oral Tab Take 1 tablet by mouth daily.      guaiFENesin-codeine (CHERATUSSIN AC) 100-10 MG/5ML Oral Solution Take 10 mL by mouth nightly as needed for cough. (Patient not taking: Reported on 10/4/2024) 120 mL 0        PMH:     Past Medical History:    Abdominal pain    Over 2 years occasionally    Blood in the stool    Calculus of kidney    Constipation    Esophageal reflux    Flatulence/gas pain/belching    Heartburn    Been having it for many years    Hemorrhoids    High cholesterol    Pain with bowel movements    Visual impairment    glasses distance/computer    Wears glasses       ROS:   Review of Systems   Constitutional: Negative.    HENT: Negative.     Cardiovascular: Negative.    Gastrointestinal: Negative.    Genitourinary: Negative.    Neurological: Negative.             PHYSICAL EXAM:    /86 (BP Location: Left arm, Patient Position: Sitting, Cuff Size: large)   Pulse 68   Temp 97.4 °F (36.3 °C) (Temporal)   Resp 18   Ht 5' 9.29\" (1.76 m)   Wt 204 lb (92.5 kg)   SpO2 99%   BMI 29.87 kg/m²   Physical Exam  Constitutional:       Appearance: Normal appearance.   Cardiovascular:      Rate and Rhythm: Normal rate.   Pulmonary:      Effort: Pulmonary effort is normal.   Abdominal:      General: Abdomen is flat. Bowel sounds are normal. There is no distension.      Palpations: Abdomen is soft. There is no mass.      Tenderness: There is no abdominal tenderness.      Hernia: No hernia is present.   Skin:     General: Skin is warm and dry.      Capillary Refill: Capillary refill takes less than 2 seconds.   Neurological:      Mental Status: He is alert.        ASSESSMENT/ PLAN:   1. Left lower quadrant abdominal pain  No bowel movement changes. No symptoms at this time. Recommend to call if symptoms change or worsen.   - Urine Dip, auto without Micro    2. Hernia  Given left  inguinal canal region identified of location of discomfort discussed likely hernia for which hernia friendly core exercises provided. History of periumbilical hernia seen on previous imaging.   - Surgery Referral - In [facility]       Health Maintenance Due   Topic Date Due    Influenza Vaccine (1) 10/01/2024    Annual Physical  11/20/2024     Pt indicates understanding and agrees to the plan.     No follow-ups on file.    Ania Turner, APRN

## 2024-10-29 LAB
ABSOLUTE BASOPHILS: 81 CELLS/UL (ref 0–200)
ABSOLUTE EOSINOPHILS: 366 CELLS/UL (ref 15–500)
ABSOLUTE LYMPHOCYTES: 1792 CELLS/UL (ref 850–3900)
ABSOLUTE MONOCYTES: 744 CELLS/UL (ref 200–950)
ABSOLUTE NEUTROPHILS: 3218 CELLS/UL (ref 1500–7800)
ALBUMIN/GLOBULIN RATIO: 1.9 (CALC) (ref 1–2.5)
ALBUMIN: 4.6 G/DL (ref 3.6–5.1)
ALKALINE PHOSPHATASE: 86 U/L (ref 36–130)
ALT: 23 U/L (ref 9–46)
AST: 22 U/L (ref 10–40)
BASOPHILS: 1.3 %
BILIRUBIN, TOTAL: 0.8 MG/DL (ref 0.2–1.2)
BUN: 17 MG/DL (ref 7–25)
CALCIUM: 9.5 MG/DL (ref 8.6–10.3)
CARBON DIOXIDE: 26 MMOL/L (ref 20–32)
CHLORIDE: 104 MMOL/L (ref 98–110)
CHOL/HDLC RATIO: 3.7 (CALC)
CHOLESTEROL, TOTAL: 167 MG/DL
CREATININE: 1.03 MG/DL (ref 0.6–1.29)
EGFR: 90 ML/MIN/1.73M2
EOSINOPHILS: 5.9 %
GLOBULIN: 2.4 G/DL (CALC) (ref 1.9–3.7)
GLUCOSE: 105 MG/DL (ref 65–99)
HDL CHOLESTEROL: 45 MG/DL
HEMATOCRIT: 47.7 % (ref 38.5–50)
HEMOGLOBIN A1C: 5.5 % OF TOTAL HGB
HEMOGLOBIN: 15.4 G/DL (ref 13.2–17.1)
LDL-CHOLESTEROL: 88 MG/DL (CALC)
LYMPHOCYTES: 28.9 %
MCH: 28.7 PG (ref 27–33)
MCHC: 32.3 G/DL (ref 32–36)
MCV: 89 FL (ref 80–100)
MONOCYTES: 12 %
MPV: 10.1 FL (ref 7.5–12.5)
NEUTROPHILS: 51.9 %
NON-HDL CHOLESTEROL: 122 MG/DL (CALC)
PLATELET COUNT: 255 THOUSAND/UL (ref 140–400)
POTASSIUM: 4.3 MMOL/L (ref 3.5–5.3)
PROTEIN, TOTAL: 7 G/DL (ref 6.1–8.1)
RDW: 12.7 % (ref 11–15)
RED BLOOD CELL COUNT: 5.36 MILLION/UL (ref 4.2–5.8)
SODIUM: 141 MMOL/L (ref 135–146)
TRIGLYCERIDES: 245 MG/DL
TSH W/REFLEX TO FT4: 2.1 MIU/L (ref 0.4–4.5)
WHITE BLOOD CELL COUNT: 6.2 THOUSAND/UL (ref 3.8–10.8)

## 2024-10-31 ENCOUNTER — OFFICE VISIT (OUTPATIENT)
Dept: INTERNAL MEDICINE CLINIC | Facility: CLINIC | Age: 49
End: 2024-10-31
Payer: COMMERCIAL

## 2024-10-31 VITALS
HEART RATE: 69 BPM | TEMPERATURE: 97 F | SYSTOLIC BLOOD PRESSURE: 110 MMHG | BODY MASS INDEX: 30 KG/M2 | DIASTOLIC BLOOD PRESSURE: 70 MMHG | OXYGEN SATURATION: 97 % | WEIGHT: 205 LBS

## 2024-10-31 DIAGNOSIS — R07.89 CHEST WALL PAIN: ICD-10-CM

## 2024-10-31 DIAGNOSIS — E78.1 HYPERTRIGLYCERIDEMIA: ICD-10-CM

## 2024-10-31 DIAGNOSIS — E78.5 DYSLIPIDEMIA: ICD-10-CM

## 2024-10-31 DIAGNOSIS — K76.0 FATTY LIVER: ICD-10-CM

## 2024-10-31 DIAGNOSIS — Z00.00 WELLNESS EXAMINATION: Primary | ICD-10-CM

## 2024-10-31 DIAGNOSIS — K21.00 GASTROESOPHAGEAL REFLUX DISEASE WITH ESOPHAGITIS WITHOUT HEMORRHAGE: ICD-10-CM

## 2024-10-31 LAB
ATRIAL RATE: 61 BPM
P AXIS: 46 DEGREES
P-R INTERVAL: 156 MS
Q-T INTERVAL: 396 MS
QRS DURATION: 84 MS
QTC CALCULATION (BEZET): 398 MS
R AXIS: 66 DEGREES
T AXIS: 40 DEGREES
VENTRICULAR RATE: 61 BPM

## 2024-10-31 NOTE — PROGRESS NOTES
Hiedy Barry  6/6/1975    Chief Complaint   Patient presents with    Physical     General health + lab results   Discomfort on L chest side that comes and goes        HPI:   Heidy Barry is a 49 year old male who presents for CPE. Is overall doing well. Has tolerated switch to atovastatin and dietary optimization with lower carbohydrates. He is working on increasing his exercise as well. He is using his PPI as needed with control of symptoms. He does have some left sided shoulder discomfort radiating to his chest. The pain is not exertional and tender to the touch, no dyspnea.     Current Outpatient Medications   Medication Sig Dispense Refill    atorvastatin 20 MG Oral Tab Take 1 tablet (20 mg total) by mouth nightly. 90 tablet 3    Tadalafil (CIALIS) 20 MG Oral Tab Take 1 tablet (20 mg total) by mouth as needed for Erectile Dysfunction. 24 tablet 1    Omeprazole 20 MG Oral Tab EC Take one tablet (20mg) daily for 8 weeks. 90 tablet 0    Ergocalciferol (VITAMIN D OR) Take by mouth daily.      Fexofenadine-Pseudoephedrine (ALLEGRA-D OR) Take by mouth daily as needed.      Multiple Vitamin (ONE-DAILY MULTI VITAMINS) Oral Tab Take 1 tablet by mouth daily.        Allergies[1]   Past Medical History:    Abdominal pain    Over 2 years occasionally    Blood in the stool    Calculus of kidney    Constipation    Esophageal reflux    Flatulence/gas pain/belching    Heartburn    Been having it for many years    Hemorrhoids    High cholesterol    Pain with bowel movements    Visual impairment    glasses distance/computer    Wears glasses      Patient Active Problem List   Diagnosis    Dyslipidemia    Nephrolithiasis    Fatty liver    Gastroesophageal reflux disease    Abdominal pain, right upper quadrant    Erosive esophagitis    Special screening for malignant neoplasm of colon    Benign neoplasm of sigmoid colon    Benign neoplasm of rectum    Internal hemorrhoids    Colon polyp    History of  nephrolithiasis      Past Surgical History:   Procedure Laterality Date    Cystoscopy,insert ureteral stent      Lipoma removal  04/27/2023      Family History   Problem Relation Age of Onset    Dementia Father     Glaucoma Father     Other (Other) Father         prostate disease    Diabetes Mother     Lipids Mother     Stroke Maternal Grandmother     Other (Other) Maternal Grandmother         prostate issues    Heart Disorder Paternal Grandfather         heart attack      Social History     Socioeconomic History    Marital status:    Tobacco Use    Smoking status: Former     Types: Cigars    Smokeless tobacco: Never    Tobacco comments:     Occas Cigar   Vaping Use    Vaping status: Never Used   Substance and Sexual Activity    Alcohol use: Yes     Alcohol/week: 1.0 standard drink of alcohol     Types: 1 Glasses of wine per week     Comment: Occasionally    Drug use: Never   Other Topics Concern    Caffeine Concern Yes    Exercise Yes    Seat Belt Yes    Special Diet No    Stress Concern No    Weight Concern No     Service No    Blood Transfusions No    Occupational Exposure No    Hobby Hazards No    Sleep Concern No    Back Care No    Bike Helmet No    Self-Exams No         REVIEW OF SYSTEMS:   GENERAL: feels well otherwise  SKIN: no rashes  EYES: no new vision changes  HEENT: not congested  LUNGS: no new dyspnea  CARDIOVASCULAR: see HPI  GI: no new abdominal pain  NEURO: no headaches    EXAM:   /70 (BP Location: Right arm, Patient Position: Sitting, Cuff Size: large)   Pulse 69   Temp 97.4 °F (36.3 °C) (Temporal)   Wt 205 lb (93 kg)   SpO2 97%   BMI 30.02 kg/m²   GENERAL: Well developed, well nourished,in no apparent distress  SKIN: No rashes,no suspicious lesions  EYES: PERRLA, EOMI, conjunctiva are clear  HEENT: atraumatic, normocephalic,ears and throat are clear  NECK: supple,no adenopathy,no bruits  LUNGS: clear to auscultation  CARDIO: RRR without murmur  GI: good BS's,no masses,  HSM or tenderness    ASSESSMENT AND PLAN:   Heidy Barry is a 49 year old male who presents for CPE    Wellness examination  Discussed age appropriate health and wellness.     Chest wall pain  Symptoms musculoskeletal in nature. EKG in office shows NSR with no ischemic changes. .   - ELECTROCARDIOGRAM, COMPLETE    Dyslipidemia  Hypertriglyceridemia  LDL at goal on atorvastatin, will continue to work on dietary optimization. Repeat lipid panel at next CPE    Fatty liver  LFT's normal on recent CMP, will CTM    Gastroesophageal reflux disease with esophagitis without hemorrhage  Stable on PRN PPI      All questions were answered and the patient agrees with the plan.     Thank you,  Rafael Sanders MD         [1]   Allergies  Allergen Reactions    Penicillins RASH    Seasonal Coughing

## 2024-11-29 ENCOUNTER — PATIENT MESSAGE (OUTPATIENT)
Dept: INTERNAL MEDICINE CLINIC | Facility: CLINIC | Age: 49
End: 2024-11-29

## 2024-12-06 DIAGNOSIS — E78.5 DYSLIPIDEMIA: ICD-10-CM

## 2024-12-11 RX ORDER — ATORVASTATIN CALCIUM 20 MG/1
20 TABLET, FILM COATED ORAL NIGHTLY
Qty: 90 TABLET | Refills: 3 | OUTPATIENT
Start: 2024-12-11

## 2024-12-11 NOTE — TELEPHONE ENCOUNTER
Disp Refills Start End    atorvastatin 20 MG Oral Tab 90 tablet 3 9/18/2024 --    Sig - Route: Take 1 tablet (20 mg total) by mouth nightly. - Oral    Sent to pharmacy as: Atorvastatin Calcium 20 MG Oral Tablet (Lipitor)    E-Prescribing Status: Receipt confirmed by pharmacy (9/18/2024  3:18 PM CDT)      Associated Diagnoses    Dyslipidemia        Pharmacy    [facility] [address] [place] [address] [place] AGUIRRE [phone], [phone]

## 2025-02-07 ENCOUNTER — OFFICE VISIT (OUTPATIENT)
Dept: INTERNAL MEDICINE CLINIC | Facility: CLINIC | Age: 50
End: 2025-02-07
Payer: COMMERCIAL

## 2025-02-07 ENCOUNTER — HOSPITAL ENCOUNTER (OUTPATIENT)
Dept: GENERAL RADIOLOGY | Facility: HOSPITAL | Age: 50
Discharge: HOME | End: 2025-02-07
Payer: COMMERCIAL

## 2025-02-07 VITALS
BODY MASS INDEX: 30.9 KG/M2 | SYSTOLIC BLOOD PRESSURE: 116 MMHG | TEMPERATURE: 99 F | WEIGHT: 211 LBS | RESPIRATION RATE: 16 BRPM | OXYGEN SATURATION: 98 % | DIASTOLIC BLOOD PRESSURE: 78 MMHG | HEIGHT: 69.29 IN | HEART RATE: 76 BPM

## 2025-02-07 DIAGNOSIS — M25.521 ELBOW PAIN, RIGHT: ICD-10-CM

## 2025-02-07 DIAGNOSIS — M25.521 ELBOW PAIN, RIGHT: Primary | ICD-10-CM

## 2025-02-07 DIAGNOSIS — M77.9 TENDINITIS: ICD-10-CM

## 2025-02-07 PROCEDURE — 3078F DIAST BP <80 MM HG: CPT

## 2025-02-07 PROCEDURE — 3074F SYST BP LT 130 MM HG: CPT

## 2025-02-07 PROCEDURE — 99214 OFFICE O/P EST MOD 30 MIN: CPT

## 2025-02-07 PROCEDURE — 3008F BODY MASS INDEX DOCD: CPT

## 2025-02-07 PROCEDURE — 73080 X-RAY EXAM OF ELBOW: CPT

## 2025-02-07 NOTE — PROGRESS NOTES
Heidy Barry is a 49 year old male.   Chief Complaint   Patient presents with    Elbow Pain     TA 16A Right elbow pain x3 weeks     HPI:    Right elbow pain continues over last 3-4 weeks. He has tried to ice area and has tried otc nsaids with some improvement. He does continue to exercise though he has stopped tennis until symptoms improve. He denies swelling, erythema. No numbness or tingling. No prior injuries or trauma to associated elbow.    Allergies:  Allergies[1]   Current Meds:  Current Outpatient Medications   Medication Sig Dispense Refill    atorvastatin 20 MG Oral Tab Take 1 tablet (20 mg total) by mouth nightly. 90 tablet 3    Tadalafil (CIALIS) 20 MG Oral Tab Take 1 tablet (20 mg total) by mouth as needed for Erectile Dysfunction. 24 tablet 1    Omeprazole 20 MG Oral Tab EC Take one tablet (20mg) daily for 8 weeks. 90 tablet 0    Ergocalciferol (VITAMIN D OR) Take by mouth daily.      Fexofenadine-Pseudoephedrine (ALLEGRA-D OR) Take by mouth daily as needed.      Multiple Vitamin (ONE-DAILY MULTI VITAMINS) Oral Tab Take 1 tablet by mouth daily.          PMH:     Past Medical History:    Abdominal pain    Over 2 years occasionally    Blood in the stool    Calculus of kidney    Constipation    Esophageal reflux    Flatulence/gas pain/belching    Heartburn    Been having it for many years    Hemorrhoids    High cholesterol    Pain with bowel movements    Visual impairment    glasses distance/computer    Wears glasses       ROS:   Review of Systems   Constitutional: Negative.    Skin: Negative.             PHYSICAL EXAM:    /78   Pulse 76   Temp 98.7 °F (37.1 °C)   Resp 16   Ht 5' 9.29\" (1.76 m)   Wt 211 lb (95.7 kg)   SpO2 98%   BMI 30.90 kg/m²   Physical Exam  Constitutional:       Appearance: Normal appearance.   Musculoskeletal:      Right elbow: Tenderness present in lateral epicondyle.   Neurological:      Mental Status: He is alert.        ASSESSMENT/ PLAN:   1. Elbow pain,  right  - XR Elbow right complete (Min 3 views) - [facility] Consult Only; Future  2. Tendinitis    Discussed likely associated to over use, check imaging- nsaid use, compression, cold compress discussed. Follow up if symptoms fail to improve physical therapy versus specialist for injection discussed.     Health Maintenance Due   Topic Date Due    Influenza Vaccine (1) 10/01/2024    Annual Depression Screening  01/01/2025           Pt indicates understanding and agrees to the plan.     No follow-ups on file.    [facility]          [1]   Allergies  Allergen Reactions    Penicillins RASH    Seasonal Coughing

## 2025-02-08 ENCOUNTER — PATIENT MESSAGE (OUTPATIENT)
Dept: INTERNAL MEDICINE CLINIC | Facility: CLINIC | Age: 50
End: 2025-02-08

## 2025-02-08 DIAGNOSIS — R22.31 ELBOW MASS, RIGHT: Primary | ICD-10-CM

## 2025-02-10 NOTE — TELEPHONE ENCOUNTER
Ultrasound ordered as follow up.     Any elbow strap could be helpful, here is one that is local or can obtain one similar.     [email or web address]-[id number]=&wl1=g&wl2=c&[id number]&wl4=[id number]&gp8=[id number]&wl6=&wl7=&wl8=&wl9=margaret&zg57=[id number]&tc12=igqhc&yh56=[id number]&gclsrc=[email or web address]&gad_source=[email or web address]

## 2025-02-10 NOTE — TELEPHONE ENCOUNTER
Routing to provider for review.    Subjective:      Patient ID: Linette Garber is a 65 y.o.  male. Chief Complaint   Patient presents with    Knee Pain     Right knee    Shoulder Pain     Left        HPI: He is here for follow-up treatment. He continues to have right knee pain. Last injection 7/17/2017 provided some in the symptoms are back in the medial compartment. Pain with weightbearing activities. Pain with twisting and turning. He is also followed for left shoulder. Previous rotator cuff repair back in June 2015. Pain is gradually returned pain now 6/10. No history of injury. Review of Systems:   Negative for fever or chills. Negative for numbness or tingling upper or lower extremity.     Past Medical History:   Diagnosis Date    Acid reflux     Arthritis     Asthma     CAD (coronary artery disease)     Depression     Diabetes ([address]     Heart disease     High blood pressure     Hyperlipidemia     Thyroid trouble        Family History   Problem Relation Age of Onset    Diabetes Other     Heart Disease Other     High Blood Pressure Other        Past Surgical History:   Procedure Laterality Date    CORONARY ARTERY BYPASS GRAFT      SHOULDER ARTHROSCOPY Left 6/12/15    left shoulder scope, SAD, RTC repair, biceps tenotomy, labral debridement, partial synovectomy    SHOULDER SURGERY Right     TONSILLECTOMY         Social History     Occupational History          Social History Main Topics    Smoking status: Former Smoker     Packs/day: 1.00     Years: 40.00     Quit date: 1/1/2006    Smokeless tobacco: Never Used    Alcohol use Yes      Comment: weekly    Drug use: No    Sexual activity: Not on file       Current Outpatient Prescriptions   Medication Sig Dispense Refill    traMADol (ULTRAM) 50 MG tablet Take 1 tablet by mouth every 6 hours as needed for Pain 40 tablet 0    oxyCODONE-acetaminophen (PERCOCET) 5-325 MG per tablet Take 1 tablets every 12 hours as needed for pain 60 tablet 0  omeprazole (PRILOSEC) 20 MG capsule Take 20 mg by mouth daily      metoprolol (LOPRESSOR) 50 MG tablet Take 50 mg by mouth nightly      levothyroxine (SYNTHROID) 88 MCG tablet Take 88 mcg by mouth every other day Takes 2 tabs every other day-alternates with 1 tab      budesonide-formoterol (SYMBICORT) 160-4.5 MCG/ACT AERO Inhale 2 puffs into the lungs 2 times daily      aspirin 325 MG EC tablet Take 325 mg by mouth daily      cloNIDine (CATAPRES) 0.2 MG tablet   Take 0.2 mg by mouth 3 times daily       doxazosin (CARDURA) 2 MG tablet   Take 2 mg by mouth nightly       fenofibrate (TRICOR) 145 MG tablet   Take 145 mg by mouth daily       fluticasone (FLONASE) 50 MCG/ACT nasal spray   2 sprays by Nasal route daily       hydrALAZINE (APRESOLINE) 50 MG tablet   Take 50 mg by mouth 3 times daily       isosorbide mononitrate (IMDUR) 30 MG CR tablet   Take 30 mg by mouth daily       levothyroxine (SYNTHROID) 88 MCG tablet   Take 88 mcg by mouth every other day       lisinopril (PRINIVIL;ZESTRIL) 40 MG tablet   Take 40 mg by mouth daily       metFORMIN ER (GLUCOPHAGE-XR) 500 MG XR tablet   Take 500 mg by mouth daily (with breakfast)       metoprolol (LOPRESSOR) 50 MG tablet   Take 100 mg by mouth daily       CRESTOR 40 MG tablet   Take 40 mg by mouth nightly       valsartan-hydrochlorothiazide (DIOVAN-HCT) 320-25 MG per tablet   Take 1 tablet by mouth daily       zolpidem (AMBIEN) 10 MG tablet   Take 5 mg by mouth nightly        No current facility-administered medications for this visit. Objective:   He is alert, oriented x 3, pleasant, well nourished, developed and in no acute distress. BP (!) 199/87   Pulse 62   Temp 98.1 °F (36.7 °C) (Temporal)   Ht 5' 5\" (1.651 m)   Wt 225 lb (102.1 kg)   BMI 37.44 kg/m²        Examination of the right knee shows: The alignment of the knee is neutral.   There is not erythema. There mild soft tissue swelling. There is mild effusion.   ROM- or skin lesions. X Rays: performed in the office today:     AP, Y and Axillary views of the left shoulder. Postsurgical changes with prior acromioplasty. Early glenohumeral arthritic changes. There is a metallic anchor noted in the footprint near the region of the supraspinatus insertion. Subacromial space is fairly well preserved. Diagnosis:        ICD-10-CM ICD-9-CM    1. Arthritis of right knee M17.11 716.96 VT ARTHROCENTESIS ASPIR&/INJ MAJOR JT/BURSA W/O US      VT TRIAMCINOLONE ACETONIDE INJ   2. Chronic left shoulder pain M25.512 719.41 XR SHOULDER LEFT (MIN 2 VIEWS)    G89.29 338.29    3. Rotator cuff tendonitis, left M75.82 726.10 VT ARTHROCENTESIS ASPIR&/INJ MAJOR JT/BURSA W/O US      VT TRIAMCINOLONE ACETONIDE INJ   4. Tear of medial meniscus of right knee, current, unspecified tear type, initial encounter S83.241A 836.0 MRI Knee Right W JT WO Contrast        Assessment/ Plan:      The natural history of the patient's diagnosis as well as the treatment options were discussed in full and questions were answered. Risks and benefits of the treatment options also reviewed in detail. He has rotator cuff tendinitis of the left shoulder recommended injection. I believe he has mild degenerative arthritis of the right knee and probable degenerative medial meniscus tear of the right knee. Plan is to inject her right knee and proceed with right knee MRI testing. Cortisone  Injection  PROCEDURE NOTE:  Pre op Diagnosis: Shoulder pain  Post op Diagnosis: Same  With the patient's permission, his left shoulder was prepped  in standard sterile fashion with  Alcohol and 2 cc of 0.25% Marcaine and 1 cc of Kenalog 40 mg was injected into the left lateral subacromial space  without difficulty. He   tolerated this well without difficulty. A band-aid was applied. He   was advised to ice the shoulder for 15-20 minutes to relieve any injection site related pain.          Risk and benefits of

## 2025-03-12 NOTE — TELEPHONE ENCOUNTER
LMTRC   See 2/13/25 Patient Message encounter and Media. Pt states one is for fungal (occasional jock itch) and the other is for allergies. Pt requesting refills of two creams:  - Betamethasone and clotrimazole dipropionate cream 1% / 0.05%  - Triamcinolone Acetonide Cream 0.1%     Rx pended for provider approval.

## 2025-03-13 RX ORDER — TRIAMCINOLONE ACETONIDE 1 MG/G
1 CREAM TOPICAL 2 TIMES DAILY PRN
Qty: 60 G | Refills: 1 | Status: SHIPPED | OUTPATIENT
Start: 2025-03-13

## 2025-03-13 RX ORDER — CLOTRIMAZOLE AND BETAMETHASONE DIPROPIONATE 10; .64 MG/G; MG/G
1 CREAM TOPICAL 2 TIMES DAILY PRN
Qty: 2 EACH | Refills: 1 | Status: SHIPPED | OUTPATIENT
Start: 2025-03-13

## 2025-03-24 ENCOUNTER — HOSPITAL ENCOUNTER (OUTPATIENT)
Dept: ULTRASOUND IMAGING | Facility: HOSPITAL | Age: 50
Discharge: HOME | End: 2025-03-24
Payer: COMMERCIAL

## 2025-03-24 DIAGNOSIS — R22.31 ELBOW MASS, RIGHT: ICD-10-CM

## 2025-03-24 PROCEDURE — 76881 US COMPL JOINT R-T W/IMG: CPT

## 2025-04-01 ENCOUNTER — OFFICE VISIT (OUTPATIENT)
Dept: INTERNAL MEDICINE CLINIC | Facility: CLINIC | Age: 50
End: 2025-04-01
Payer: COMMERCIAL

## 2025-04-01 VITALS
BODY MASS INDEX: 28.14 KG/M2 | DIASTOLIC BLOOD PRESSURE: 74 MMHG | SYSTOLIC BLOOD PRESSURE: 122 MMHG | HEART RATE: 72 BPM | WEIGHT: 190 LBS | RESPIRATION RATE: 16 BRPM | HEIGHT: 69 IN | OXYGEN SATURATION: 98 %

## 2025-04-01 DIAGNOSIS — D17.21 LIPOMA OF RIGHT UPPER EXTREMITY: ICD-10-CM

## 2025-04-01 DIAGNOSIS — M77.11 LATERAL EPICONDYLITIS OF RIGHT ELBOW: Primary | ICD-10-CM

## 2025-04-01 PROCEDURE — 3078F DIAST BP <80 MM HG: CPT | Performed by: FAMILY MEDICINE

## 2025-04-01 PROCEDURE — 3008F BODY MASS INDEX DOCD: CPT | Performed by: FAMILY MEDICINE

## 2025-04-01 PROCEDURE — 99213 OFFICE O/P EST LOW 20 MIN: CPT | Performed by: FAMILY MEDICINE

## 2025-04-01 PROCEDURE — 3074F SYST BP LT 130 MM HG: CPT | Performed by: FAMILY MEDICINE

## 2025-04-01 NOTE — PROGRESS NOTES
Heidy Barry  6/6/1975    No chief complaint on file.      HPI:   Heidy Barry is a 49 year old male who presents for follow-up. He had a US and XR of his right elbow. Showed a lipoma and lateral epicondylitis. Has started some home PT and has a tennis elbow strap.     Current Outpatient Medications   Medication Sig Dispense Refill    clotrimazole-betamethasone 1-0.05 % External Cream Apply 1 Application  topically 2 (two) times daily as needed. 2 each 1    triamcinolone 0.1 % External Cream Apply 1 Application topically 2 (two) times daily as needed. 60 g 1    atorvastatin 20 MG Oral Tab Take 1 tablet (20 mg total) by mouth nightly. 90 tablet 3    Tadalafil (CIALIS) 20 MG Oral Tab Take 1 tablet (20 mg total) by mouth as needed for Erectile Dysfunction. 24 tablet 1    Omeprazole 20 MG Oral Tab EC Take one tablet (20mg) daily for 8 weeks. 90 tablet 0    Ergocalciferol (VITAMIN D OR) Take by mouth daily.      Fexofenadine-Pseudoephedrine (ALLEGRA-D OR) Take by mouth daily as needed.      Multiple Vitamin (ONE-DAILY MULTI VITAMINS) Oral Tab Take 1 tablet by mouth daily.        Allergies[1]   Past Medical History:    Abdominal pain    Over 2 years occasionally    Blood in the stool    Calculus of kidney    Constipation    Esophageal reflux    Flatulence/gas pain/belching    Heartburn    Been having it for many years    Hemorrhoids    High cholesterol    Pain with bowel movements    Visual impairment    glasses distance/computer    Wears glasses      Patient Active Problem List   Diagnosis    Dyslipidemia    Nephrolithiasis    Fatty liver    Gastroesophageal reflux disease    Abdominal pain, right upper quadrant    Erosive esophagitis    Special screening for malignant neoplasm of colon    Benign neoplasm of sigmoid colon    Benign neoplasm of rectum    Internal hemorrhoids    Colon polyp    History of nephrolithiasis      Past Surgical History:   Procedure Laterality Date    Cystoscopy,insert  ureteral stent      Lipoma removal  04/27/2023      Family History   Problem Relation Age of Onset    Dementia Father     Glaucoma Father     Other (Other) Father         prostate disease    Diabetes Mother     Lipids Mother     Stroke Maternal Grandmother     Other (Other) Maternal Grandmother         prostate issues    Heart Disorder Paternal Grandfather         heart attack      Social History     Socioeconomic History    Marital status:    Tobacco Use    Smoking status: Former     Types: Cigars    Smokeless tobacco: Never    Tobacco comments:     Occas Cigar   Vaping Use    Vaping status: Never Used   Substance and Sexual Activity    Alcohol use: Yes     Alcohol/week: 1.0 standard drink of alcohol     Types: 1 Glasses of wine per week     Comment: Occasionally    Drug use: Never   Other Topics Concern    Caffeine Concern Yes    Exercise Yes    Seat Belt Yes    Special Diet No    Stress Concern No    Weight Concern No     Service No    Blood Transfusions No    Occupational Exposure No    Hobby Hazards No    Sleep Concern No    Back Care No    Bike Helmet No    Self-Exams No         REVIEW OF SYSTEMS:   GENERAL: feels well otherwise    EXAM:   /74   Pulse 72   Resp 16   Ht 5' 9\" (1.753 m)   Wt 190 lb (86.2 kg)   SpO2 98%   BMI 28.06 kg/m²   GENERAL: Well developed, well nourished,in no apparent distress  MSK: palpable small lipoma of the R medial forearm. Mild TTP over the common extensor mass. Reproducible pain with resisted wrist and middle finger ext.     ASSESSMENT AND PLAN:   Heidy Barry is a 49 year old male who presents for follow-up    Lateral epicondylitis of right elbow  Discussed treatment options. Will begin dedicated home rehabilitation program, counter force bracing and topical anti-inflammatories. Discussed options for CSI and formal PT if not improving.     Lipoma of right upper extremity  Overall asymptomatic. CTM      All questions were answered and the  patient agrees with the plan.     Thank you,  Rafael Sanders MD         [1]   Allergies  Allergen Reactions    Penicillins RASH    Seasonal Coughing

## 2025-07-28 ENCOUNTER — OFFICE VISIT (OUTPATIENT)
Dept: INTERNAL MEDICINE CLINIC | Facility: CLINIC | Age: 50
End: 2025-07-28
Payer: COMMERCIAL

## 2025-07-28 VITALS
RESPIRATION RATE: 16 BRPM | DIASTOLIC BLOOD PRESSURE: 68 MMHG | HEIGHT: 69 IN | BODY MASS INDEX: 30.81 KG/M2 | HEART RATE: 78 BPM | WEIGHT: 208 LBS | SYSTOLIC BLOOD PRESSURE: 120 MMHG | OXYGEN SATURATION: 98 % | TEMPERATURE: 99 F

## 2025-07-28 DIAGNOSIS — J06.9 ACUTE URI: Primary | ICD-10-CM

## 2025-07-28 LAB
COVID19 BINAX NOW ANTIGEN: NOT DETECTED
OPERATOR ID: NORMAL
POCT LOT NUMBER: NORMAL

## 2025-07-28 PROCEDURE — 3078F DIAST BP <80 MM HG: CPT | Performed by: INTERNAL MEDICINE

## 2025-07-28 PROCEDURE — 3008F BODY MASS INDEX DOCD: CPT | Performed by: INTERNAL MEDICINE

## 2025-07-28 PROCEDURE — 99213 OFFICE O/P EST LOW 20 MIN: CPT | Performed by: INTERNAL MEDICINE

## 2025-07-28 PROCEDURE — 3074F SYST BP LT 130 MM HG: CPT | Performed by: INTERNAL MEDICINE

## 2025-07-28 PROCEDURE — G2211 COMPLEX E/M VISIT ADD ON: HCPCS | Performed by: INTERNAL MEDICINE

## 2025-07-28 RX ORDER — AZITHROMYCIN 250 MG/1
TABLET, FILM COATED ORAL
Qty: 6 TABLET | Refills: 0 | Status: SHIPPED | OUTPATIENT
Start: 2025-07-28 | End: 2025-08-02

## 2025-07-28 NOTE — PROGRESS NOTES
Heidy Barry  6/6/1975    SUBJECTIVE   Heidy Barry is a 50 year old male who presents for evaluation.    History of Present Illness      The patient reports a 72-hour duration of what began as a sore throat, and gradually became associated with a nasal and sinus congestion, and intermittent cough that is with minimal production and no shortness of breath.  He reports some wheezing.  Symptom onset was following a return after a [place] travel [place].  No fevers or chills.  No fatigue.  No arthralgias or myalgias.  No known sick contacts.    Review of Systems   No f/c/chest pain or sob. No abd pain/n/v/d. No ha or dizziness. No numbness, tingling, or weakness. No other complaints today.    Current Medications[1]   Allergies[2]   Past Medical History[3]   Problem List[4]   Past Surgical History[5]   Short Social Hx on File[6]      OBJECTIVE:   /68   Pulse 78   Temp 98.8 °F (37.1 °C)   Resp 16   Ht 5' 9\" (1.753 m)   Wt 208 lb (94.3 kg)   SpO2 98%   BMI 30.72 kg/m²   Constitutional: Oriented to person, place, and time. No distress.   HEENT:  Normocephalic and atraumatic.  Cardiovascular: Normal rate, regular rhythm and intact distal pulses.  No murmur, rubs or gallops.   Pulmonary/Chest: Effort normal and breath sounds normal. No respiratory distress.  Abdominal: Soft, nontender, and nondistended.  Musculoskeletal: No edema  Lymphadenopathy: No cervical adenopathy.   Skin: Skin is warm and dry. No rash.  Psychiatric: Normal mood and affect.     ASSESSMENT AND PLAN:   Heidy Barry is a 50 year old male who presents for evaluation.     Assessment & Plan    Acute URI:  In light of recent travel, despite lack of systemic symptoms, COVID-19 testing has been completed.  Suspected bacterial etiology, and given the severity of symptoms, azithromycin has been ordered   May continue use of over-the-counter supportive/symptomatic management  Contact office if symptoms persist or  worsen    The patient indicates understanding of these issues and agrees to the plan.    TODAY'S ORDERS     No orders of the defined types were placed in this encounter.      Meds & Refills:  Requested Prescriptions      No prescriptions requested or ordered in this encounter       Imaging & Consults:  None    No follow-ups on file.  There are no Patient Instructions on file for this visit.    All questions were answered and the patient agrees with the plan.     Thank you,  Jay Jay Guardado MD       [1]   Current Outpatient Medications   Medication Sig Dispense Refill    clotrimazole-betamethasone 1-0.05 % External Cream Apply 1 Application  topically 2 (two) times daily as needed. 2 each 1    triamcinolone 0.1 % External Cream Apply 1 Application topically 2 (two) times daily as needed. 60 g 1    atorvastatin 20 MG Oral Tab Take 1 tablet (20 mg total) by mouth nightly. 90 tablet 3    Tadalafil (CIALIS) 20 MG Oral Tab Take 1 tablet (20 mg total) by mouth as needed for Erectile Dysfunction. 24 tablet 1    Omeprazole 20 MG Oral Tab EC Take one tablet (20mg) daily for 8 weeks. 90 tablet 0    Ergocalciferol (VITAMIN D OR) Take by mouth daily.      Fexofenadine-Pseudoephedrine (ALLEGRA-D OR) Take by mouth daily as needed.      Multiple Vitamin (ONE-DAILY MULTI VITAMINS) Oral Tab Take 1 tablet by mouth daily.     [2]   Allergies  Allergen Reactions    Penicillins RASH    Seasonal Coughing   [3]   Past Medical History:   Abdominal pain    Over 2 years occasionally    Blood in the stool    Calculus of kidney    Constipation    Esophageal reflux    Flatulence/gas pain/belching    Heartburn    Been having it for many years    Hemorrhoids    High cholesterol    Pain with bowel movements    Visual impairment    glasses distance/computer    Wears glasses   [4]   Patient Active Problem List  Diagnosis    Dyslipidemia    Nephrolithiasis    Fatty liver    Gastroesophageal reflux disease    Abdominal pain, right upper quadrant     Erosive esophagitis    Special screening for malignant neoplasm of colon    Benign neoplasm of sigmoid colon    Benign neoplasm of rectum    Internal hemorrhoids    Colon polyp    History of nephrolithiasis   [5]   Past Surgical History:  Procedure Laterality Date    Cystoscopy,insert ureteral stent      Lipoma removal  04/27/2023   [6]   Social History  Socioeconomic History    Marital status:    Tobacco Use    Smoking status: Former     Types: Cigars    Smokeless tobacco: Never    Tobacco comments:     Occas Cigar   Vaping Use    Vaping status: Never Used   Substance and Sexual Activity    Alcohol use: Yes     Alcohol/week: 1.0 standard drink of alcohol     Types: 1 Glasses of wine per week     Comment: Occasionally    Drug use: Never   Other Topics Concern    Caffeine Concern Yes    Exercise Yes    Seat Belt Yes    Special Diet No    Stress Concern No    Weight Concern No     Service No    Blood Transfusions No    Occupational Exposure No    Hobby Hazards No    Sleep Concern No    Back Care No    Bike Helmet No    Self-Exams No

## 2025-08-07 ENCOUNTER — TELEPHONE (OUTPATIENT)
Dept: INTERNAL MEDICINE CLINIC | Facility: CLINIC | Age: 50
End: 2025-08-07

## 2025-08-07 DIAGNOSIS — Z13.29 SCREENING FOR ENDOCRINE, METABOLIC AND IMMUNITY DISORDER: ICD-10-CM

## 2025-08-07 DIAGNOSIS — Z13.0 SCREENING FOR ENDOCRINE, METABOLIC AND IMMUNITY DISORDER: ICD-10-CM

## 2025-08-07 DIAGNOSIS — Z13.0 SCREENING FOR BLOOD DISEASE: ICD-10-CM

## 2025-08-07 DIAGNOSIS — Z13.29 SCREENING FOR THYROID DISORDER: ICD-10-CM

## 2025-08-07 DIAGNOSIS — Z13.228 SCREENING FOR ENDOCRINE, METABOLIC AND IMMUNITY DISORDER: ICD-10-CM

## 2025-08-07 DIAGNOSIS — Z13.220 SCREENING FOR LIPOID DISORDERS: ICD-10-CM

## 2025-08-07 DIAGNOSIS — Z00.00 ROUTINE GENERAL MEDICAL EXAMINATION AT A HEALTH CARE FACILITY: Primary | ICD-10-CM

## (undated) DEVICE — SYRINGE 20CC LL TIP

## (undated) DEVICE — Device

## (undated) DEVICE — SUT VICRYL 2-0 J111T

## (undated) DEVICE — SPONGE STICK WITH PVP-I: Brand: KENDALL

## (undated) DEVICE — VIOLET BRAIDED (POLYGLACTIN 910), SYNTHETIC ABSORBABLE SUTURE: Brand: COATED VICRYL

## (undated) DEVICE — HYBRID SENSOR WIRE .035 STR

## (undated) DEVICE — TIGERTAIL 5F FLXTIP 70CM

## (undated) DEVICE — SENS GUIW URO 150CM NIT SS

## (undated) DEVICE — SEAL Y BIOPSY PORT P6R SCOPE

## (undated) DEVICE — STERILE POLYISOPRENE POWDER-FREE SURGICAL GLOVES: Brand: PROTEXIS

## (undated) DEVICE — SLEEVE KENDALL SCD EXPRESS MED

## (undated) DEVICE — SOL NACL IRRIG 0.9% 1000ML BTL

## (undated) DEVICE — NITINOL STONE RETRIEVAL BASKET: Brand: ZERO TIP

## (undated) DEVICE — CYSTO CDS-LF: Brand: MEDLINE INDUSTRIES, INC.

## (undated) DEVICE — MOSES 200 FIBER

## (undated) DEVICE — ADHESIVE MASTISOL 2/3ML

## (undated) DEVICE — LIGHT HANDLE

## (undated) DEVICE — SEAL BIOPSY PORT ACMI

## (undated) DEVICE — SOLUTION  .9 3000ML

## (undated) DEVICE — LAPAROTOMY CDS: Brand: MEDLINE INDUSTRIES, INC.

## (undated) NOTE — Clinical Note
I had the pleasure of seeing Clara Greco on 1/30/2023. Please see my attached note.   Camila Barrios MD FACS [facility]

## (undated) NOTE — LETTER
11/15/21        Nahun Lopez  [address]      Dear Chad Loredo,    [facility] records indicate that you have outstanding lab work and or testing that was ordered for you and has not yet been completed:  Orders Placed This Encounter

## (undated) NOTE — LETTER
Alona Kaur , [address] [place] [id number]  PH: [phone]  FAX: [phone]      Dear Dr. Clyde Clark,    Thank you for the referral! I've attached my clinic note with recommendations, please feel free to reach out with any additional questions.     Thanks,  Tete Smith      2022   [address],  1975